# Patient Record
Sex: FEMALE | Race: WHITE | NOT HISPANIC OR LATINO | Employment: FULL TIME | URBAN - METROPOLITAN AREA
[De-identification: names, ages, dates, MRNs, and addresses within clinical notes are randomized per-mention and may not be internally consistent; named-entity substitution may affect disease eponyms.]

---

## 2021-07-27 PROBLEM — F90.9 ADHD: Status: ACTIVE | Noted: 2021-07-27

## 2021-07-27 PROBLEM — F41.0 PANIC DISORDER: Status: ACTIVE | Noted: 2021-07-27

## 2021-07-27 PROBLEM — F41.9 ANXIETY: Status: ACTIVE | Noted: 2021-07-27

## 2022-03-21 ENCOUNTER — OFFICE VISIT (OUTPATIENT)
Dept: FAMILY MEDICINE CLINIC | Facility: CLINIC | Age: 22
End: 2022-03-21

## 2022-03-21 VITALS
HEIGHT: 63 IN | DIASTOLIC BLOOD PRESSURE: 88 MMHG | WEIGHT: 225.4 LBS | TEMPERATURE: 97.3 F | HEART RATE: 101 BPM | BODY MASS INDEX: 39.94 KG/M2 | OXYGEN SATURATION: 98 % | SYSTOLIC BLOOD PRESSURE: 112 MMHG

## 2022-03-21 DIAGNOSIS — F41.1 GENERALIZED ANXIETY DISORDER WITH PANIC ATTACKS: ICD-10-CM

## 2022-03-21 DIAGNOSIS — R51.9 CHRONIC DAILY HEADACHE: ICD-10-CM

## 2022-03-21 DIAGNOSIS — E53.8 LOW SERUM VITAMIN B12: ICD-10-CM

## 2022-03-21 DIAGNOSIS — F41.0 GENERALIZED ANXIETY DISORDER WITH PANIC ATTACKS: ICD-10-CM

## 2022-03-21 DIAGNOSIS — R42 DIZZINESS: Primary | ICD-10-CM

## 2022-03-21 DIAGNOSIS — R79.89 ELEVATED LFTS: ICD-10-CM

## 2022-03-21 LAB
B-HCG UR QL: NEGATIVE
BILIRUB BLD-MCNC: NEGATIVE MG/DL
CLARITY, POC: CLEAR
COLOR UR: YELLOW
EXPIRATION DATE: NORMAL
EXPIRATION DATE: NORMAL
GLUCOSE UR STRIP-MCNC: NEGATIVE MG/DL
INTERNAL NEGATIVE CONTROL: NEGATIVE
INTERNAL POSITIVE CONTROL: POSITIVE
KETONES UR QL: NEGATIVE
LEUKOCYTE EST, POC: NEGATIVE
Lab: NORMAL
Lab: NORMAL
NITRITE UR-MCNC: NEGATIVE MG/ML
PH UR: 5.5 [PH] (ref 5–8)
PROT UR STRIP-MCNC: NEGATIVE MG/DL
RBC # UR STRIP: NEGATIVE /UL
SP GR UR: 1 (ref 1–1.03)
UROBILINOGEN UR QL: NORMAL

## 2022-03-21 PROCEDURE — 99204 OFFICE O/P NEW MOD 45 MIN: CPT | Performed by: NURSE PRACTITIONER

## 2022-03-21 PROCEDURE — 96372 THER/PROPH/DIAG INJ SC/IM: CPT | Performed by: NURSE PRACTITIONER

## 2022-03-21 PROCEDURE — 81003 URINALYSIS AUTO W/O SCOPE: CPT | Performed by: NURSE PRACTITIONER

## 2022-03-21 PROCEDURE — 81025 URINE PREGNANCY TEST: CPT | Performed by: NURSE PRACTITIONER

## 2022-03-21 RX ORDER — LAMOTRIGINE 150 MG/1
150 TABLET ORAL DAILY
COMMUNITY
Start: 2022-03-06 | End: 2022-04-04 | Stop reason: SINTOL

## 2022-03-21 RX ORDER — LEVONORGESTREL AND ETHINYL ESTRADIOL 0.1-0.02MG
KIT ORAL
COMMUNITY
Start: 2022-01-25 | End: 2022-04-04 | Stop reason: SDDI

## 2022-03-21 RX ORDER — LORAZEPAM 0.5 MG/1
0.5 TABLET ORAL 2 TIMES DAILY PRN
COMMUNITY
Start: 2022-02-21

## 2022-03-21 RX ORDER — CYANOCOBALAMIN 1000 UG/ML
1000 INJECTION, SOLUTION INTRAMUSCULAR; SUBCUTANEOUS ONCE
Status: COMPLETED | OUTPATIENT
Start: 2022-03-21 | End: 2022-03-21

## 2022-03-21 RX ORDER — LISDEXAMFETAMINE DIMESYLATE 30 MG/1
30 CAPSULE ORAL EVERY MORNING
COMMUNITY
Start: 2022-03-07 | End: 2022-08-05 | Stop reason: ALTCHOICE

## 2022-03-21 RX ORDER — BUSPIRONE HYDROCHLORIDE 30 MG/1
30 TABLET ORAL 2 TIMES DAILY
COMMUNITY
Start: 2022-03-06

## 2022-03-21 RX ADMIN — CYANOCOBALAMIN 1000 MCG: 1000 INJECTION, SOLUTION INTRAMUSCULAR; SUBCUTANEOUS at 16:21

## 2022-03-21 NOTE — PROGRESS NOTES
Chief Complaint  Establish Care (New pt, discuss abnormal labs, fatigue, headaches, )    Subjective          Carmita Reyna presents to [facility]  History of Present Illness new pt here to [facility] for fatigue, HA, abnl liver enzymes.     Had labs done at [facility] 2 weeks ago:   [id number] taking B12  Vitamin W-18-eatshia 2000IU  plt-434  AST-36  ALT-75  A1C-5.2  TSH-3.26    Recently she has felt dizzy, hot and cold, and having to urinate freq.  Worried she had T2DM.  Lab was normal.     Dizziness occurs without regard to mvmt-sometimes occurs with sitting, sometimes when walking around. Sometimes change in mvmt improves.  Not seem to be related to anxiety. Began 2020 and occurs at least 2x/day lasts seconds to minutes. Occurs most frequently when hot-room spins which triggers anxiety.     Sx did seem to worsen when started on lamictal which is one reason she is weaning off. She also stopped DAVID as it affected her mood.  Given topical-not sure name-and not started it yet due to concern for side effects.     Buspar for anxiety for long time without side effects.      Vyvanse for ADHD.  Followed by psych.     Heart palpitations-saw cardiology and had normal work up and not returned. Was on BB for short time but stopped due to dizziness    Gets a lot of HA, no aura-usu over left brow-occur most days-has light sensitivity with black spots left eye that she was told normal-no n/v.  OTC to resolve.      Has frequent am nausea and heartburn, not on meds, not evaluated. Has freq IBS type sx with nausea and freq loose stools, enoch after eating-usually within 30 mins or if anxious. No celiac, no FH IBD.     Does have joint pain enoch b/l knees/elbows. No swelling no FH RA.     Works at [employer] which she enjoys, lives with BF and dog.     +sexually active-condoms, topical which she has not yet started    Denies breast, ovarian, colon cancer.  Dad has anxiety, no schizophrenia,  "suicides    Objective   Vital Signs:   /88   Pulse 101   Temp 97.3 °F (36.3 °C)   Ht 160 cm (63\")   Wt 102 kg (225 lb 6.4 oz)   SpO2 98%   BMI 39.93 kg/m²     Physical Exam  Vitals and nursing note reviewed.   Constitutional:       General: She is not in acute distress.     Appearance: She is well-developed. She is not ill-appearing.   HENT:      Head: Normocephalic and atraumatic.      Right Ear: Tympanic membrane, ear canal and external ear normal.      Left Ear: Tympanic membrane, ear canal and external ear normal.      Mouth/Throat:      Mouth: Mucous membranes are moist.      Pharynx: Uvula midline. No posterior oropharyngeal erythema.   Eyes:      General: No scleral icterus.        Right eye: No discharge.         Left eye: No discharge.      Conjunctiva/sclera: Conjunctivae normal.      Pupils: Pupils are equal, round, and reactive to light.   Neck:      Thyroid: No thyromegaly.   Cardiovascular:      Rate and Rhythm: Normal rate and regular rhythm.      Heart sounds: Normal heart sounds. No murmur heard.  Pulmonary:      Effort: Pulmonary effort is normal.      Breath sounds: Normal breath sounds.   Abdominal:      General: Bowel sounds are normal.      Palpations: Abdomen is soft.      Tenderness: There is no abdominal tenderness.   Musculoskeletal:         General: No deformity.      Cervical back: Neck supple.      Comments: Gait smooth and steady   Lymphadenopathy:      Cervical: No cervical adenopathy.   Skin:     General: Skin is warm and dry.   Neurological:      General: No focal deficit present.      Mental Status: She is alert and oriented to person, place, and time.   Psychiatric:         Attention and Perception: Attention and perception normal.         Mood and Affect: Affect normal. Mood is anxious.         Speech: Speech normal.         Behavior: Behavior normal. Behavior is cooperative.         Thought Content: Thought content normal.         Cognition and Memory: Cognition " normal.      Comments: Very pleasant, conversant        Result Review :                 Assessment and Plan    Diagnoses and all orders for this visit:    1. Dizziness (Primary)  -     POCT urinalysis dipstick, automated  -     POCT pregnancy, urine    2. Low serum vitamin B12  -     cyanocobalamin injection 1,000 mcg    3. Elevated LFTs    4. Generalized anxiety disorder with panic attacks    5. Chronic daily headache      New pt with chronic health issues.  Dizziness seems to be most significant.  B12 is low which can cause-will start injections weekly and continue supplement po.  If not better with supplementation should consider MRI.  F/U in 1 month.     LFTs were a little elevated.  Nonspecific.  Recheck.  Avoid etoh, tylenol, may be diet related.  Recheck in 1 month.  If elevated US liver.    Anxiety/Panic:  She is followed by psychiatry for this and ADHD. May be causing some of her sx.  Will have to sort through.     Daily HA:  Does not seem migrainous.  No RED FLAGS in history/exam. Mgmt and s/s needing emergent eval discussed.  Will eval further in 1 month.  Seem stable for now.     Likely has IBS but does have low B12, not vegetarian.  My have celiac or other absorption issue.  Will work up further at next visit.          Follow Up   Return in about 1 month (around 4/21/2022) for Recheck.  Patient was given instructions and counseling regarding her condition or for health maintenance advice. Please see specific information pulled into the AVS if appropriate.

## 2022-03-23 PROBLEM — F41.0 GENERALIZED ANXIETY DISORDER WITH PANIC ATTACKS: Status: ACTIVE | Noted: 2022-03-23

## 2022-03-23 PROBLEM — E53.8 LOW SERUM VITAMIN B12: Status: ACTIVE | Noted: 2022-03-23

## 2022-03-23 PROBLEM — F41.1 GENERALIZED ANXIETY DISORDER WITH PANIC ATTACKS: Status: ACTIVE | Noted: 2022-03-23

## 2022-03-23 PROBLEM — R51.9 CHRONIC DAILY HEADACHE: Status: ACTIVE | Noted: 2022-03-23

## 2022-03-23 PROBLEM — F41.0 PANIC DISORDER: Status: RESOLVED | Noted: 2021-07-27 | Resolved: 2022-03-23

## 2022-03-23 PROBLEM — F41.9 ANXIETY: Status: RESOLVED | Noted: 2021-07-27 | Resolved: 2022-03-23

## 2022-03-23 PROBLEM — R42 DIZZINESS: Status: ACTIVE | Noted: 2022-03-23

## 2022-04-04 ENCOUNTER — OFFICE VISIT (OUTPATIENT)
Dept: FAMILY MEDICINE CLINIC | Facility: CLINIC | Age: 22
End: 2022-04-04

## 2022-04-04 VITALS — OXYGEN SATURATION: 97 % | TEMPERATURE: 97.5 F | BODY MASS INDEX: 39.93 KG/M2 | HEIGHT: 63 IN

## 2022-04-04 DIAGNOSIS — R55 SYNCOPE, UNSPECIFIED SYNCOPE TYPE: Primary | ICD-10-CM

## 2022-04-04 DIAGNOSIS — R00.0 TACHYCARDIA: ICD-10-CM

## 2022-04-04 LAB
B-HCG UR QL: NEGATIVE
EXPIRATION DATE: NORMAL
INTERNAL NEGATIVE CONTROL: NEGATIVE
INTERNAL POSITIVE CONTROL: POSITIVE
Lab: NORMAL

## 2022-04-04 PROCEDURE — 93000 ELECTROCARDIOGRAM COMPLETE: CPT | Performed by: NURSE PRACTITIONER

## 2022-04-04 PROCEDURE — 99214 OFFICE O/P EST MOD 30 MIN: CPT | Performed by: NURSE PRACTITIONER

## 2022-04-04 PROCEDURE — 81025 URINE PREGNANCY TEST: CPT | Performed by: NURSE PRACTITIONER

## 2022-04-22 ENCOUNTER — TELEPHONE (OUTPATIENT)
Dept: CARDIOLOGY | Facility: CLINIC | Age: 22
End: 2022-04-22

## 2022-04-22 ENCOUNTER — OFFICE VISIT (OUTPATIENT)
Dept: CARDIOLOGY | Facility: CLINIC | Age: 22
End: 2022-04-22

## 2022-04-22 VITALS
SYSTOLIC BLOOD PRESSURE: 120 MMHG | OXYGEN SATURATION: 97 % | HEIGHT: 63 IN | WEIGHT: 221 LBS | DIASTOLIC BLOOD PRESSURE: 76 MMHG | BODY MASS INDEX: 39.16 KG/M2 | HEART RATE: 88 BPM

## 2022-04-22 DIAGNOSIS — R55 SYNCOPE AND COLLAPSE: ICD-10-CM

## 2022-04-22 DIAGNOSIS — R42 DIZZINESS: Primary | ICD-10-CM

## 2022-04-22 PROCEDURE — 99203 OFFICE O/P NEW LOW 30 MIN: CPT | Performed by: INTERNAL MEDICINE

## 2022-04-22 RX ORDER — PROPRANOLOL HYDROCHLORIDE 10 MG/1
10 TABLET ORAL 2 TIMES DAILY PRN
COMMUNITY
Start: 2022-04-11 | End: 2022-08-05 | Stop reason: ALTCHOICE

## 2022-04-22 NOTE — PROGRESS NOTES
Subjective:     Encounter Date:04/22/22      Patient ID: Carmita Reyna is a 21 y.o. female.    Chief Complaint:  History of Present Illness    Dear Britt,    I had the pleasure of seeing this patient in the office today for initial evaluation and consultation.  I appreciate that you sent her in to see us.  They come in today to be seen for evaluation of episodes of dizziness and presyncope.    Patient has had essentially lifelong history of symptoms.  She was apparently seen by Dr. Nichols as a pediatric cardiologist and was diagnosed with they believe with a vasovagal syncope.  I do not have any records from there.  They apparently did an echocardiogram, some other evaluation, and then she was on fludrocortisone apparently for years but at some point that stopped.    Her mother is with her today and relates that around the start of college she seemed to be doing a lot better but then started having more issues lately.  She did have COVID in December and since then has been having a lot more problems.    She states that anytime that she is laying down if she sits up fast she is gets lightheaded.  If she stands up fast she gets even more lightheaded.  Many times she will get some tunnel vision.  She has coat  discomfort of her posterior shoulder and posterior neck when she is standing up (indicative of the lower blood pressure). She says that when she had orthostatic blood pressures checked at that that made her feel bad and unsteady.  She frequently will feel that her heart rate goes up and beats hard and fast when she stands up.  She does not think she is ever completely blacked out but she is gotten real close.  Many times she has to quickly lie down to keep from blacking out, that is what she typically does so that she does not blackout.  She is never fallen and injured her self.  She has been have a lot of problems with diaphoresis with this.  It is really hard her take a hot shower because she gets very  "lightheaded and feels like she could pass out so she has to make sure that the temperature in the shower is pretty cool.  She drinks water all the time and states that she is always very thirsty, she does not deliberately add salt but she does not try to limit the salt in her diet.  She has been gaining weight.  She just had a visit with her OB/GYN and they have sent blood work on her which I have reviewed.    She was seen by Dr. King at [facility] in 2020 and she wore a Holter monitor at that time with an average heart rate of 107 bpm.  She was told that there were no rhythm issues at that point.    The following portions of the patient's history were reviewed and updated as appropriate: allergies, current medications, past family history, past medical history, past social history, past surgical history and problem list.    Procedures       Objective:     Vitals:    04/22/22 1024   BP: 120/76   BP Location: Left arm   Pulse: 88   SpO2: 97%   Weight: 100 kg (221 lb)   Height: 160 cm (63\")     Body mass index is 39.15 kg/m².      Vitals reviewed.   Constitutional:       General: Not in acute distress.     Appearance: Well-developed. Not diaphoretic.   Eyes:      General:         Right eye: No discharge.         Left eye: No discharge.      Conjunctiva/sclera: Conjunctivae normal.      Pupils: Pupils are equal, round, and reactive to light.   HENT:      Head: Normocephalic and atraumatic.      Nose: Nose normal.   Neck:      Thyroid: No thyromegaly.      Trachea: No tracheal deviation.      Lymphadenopathy: No cervical adenopathy.   Pulmonary:      Effort: Pulmonary effort is normal. No respiratory distress.      Breath sounds: Normal breath sounds. No stridor.   Chest:      Chest wall: Not tender to palpatation.   Cardiovascular:      Normal rate. Regular rhythm.      Murmurs: There is no murmur.      . No S3 gallop. No click. No rub.   Pulses:     Intact distal pulses.   Abdominal:      General: Bowel sounds are " normal. There is no distension.      Palpations: Abdomen is soft. There is no abdominal mass.      Tenderness: There is no abdominal tenderness. There is no guarding or rebound.   Musculoskeletal: Normal range of motion.         General: No tenderness or deformity.      Cervical back: Normal range of motion and neck supple. Skin:     General: Skin is warm and dry.      Findings: No erythema or rash.   Neurological:      Mental Status: Alert and oriented to person, place, and time.      Deep Tendon Reflexes: Reflexes are normal and symmetric.   Psychiatric:         Thought Content: Thought content normal.         Data and records reviewed:     Lab Results   Component Value Date    BUN 12 05/20/2020    CREATININE 0.8 05/20/2020    BCR 15.0 05/20/2020    K 4.4 05/20/2020    CO2 25 05/20/2020    CALCIUM 9.6 05/20/2020    ALBUMIN 4.3 05/20/2020    LABIL2 1.4 05/20/2020    AST 32 05/20/2020    ALT 22 05/20/2020     No results found for: CHOL  No results found for: TRIG  No results found for: HDL  No results found for: LDL  No results found for: VLDL  No results found for: LDLHDL    No radiology results for the last 90 days.          Assessment:          Diagnosis Plan   1. Dizziness  Adult Transthoracic Echo Complete W/ Cont if Necessary Per Protocol    Holter Monitor - 24 Hour    Tilt Table   2. Syncope and collapse  Adult Transthoracic Echo Complete W/ Cont if Necessary Per Protocol    Holter Monitor - 24 Hour    Tilt Table          Plan:       1.  Dizziness-sounds highly likely the patient has autonomic dysfunction.  She is concerned about having POTS syndrome, although this easily could also be more of an autonomic vasodepressor pathology as well.  We will place a Holter monitor because I want to see what her heart rate variability is through the day and at night and what her average heart rate is.  We will get an echocardiogram to exclude structural or ventricular issues.  We will also schedule a tilt table test.  In  the meantime I instructed her to continue to work to stay well-hydrated I have asked her to add additional salt to her diet.  Once we have this diagnostic testing complete then we can make appropriate decisions regarding long-term therapy.  Thank you very much for allowing us to participate in the care of this pleasant patient.  Please don't hesitate to call if I can be of assistance in any way.      Current Outpatient Medications:   •  busPIRone (BUSPAR) 30 MG tablet, Take 30 mg by mouth 2 (Two) Times a Day., Disp: , Rfl:   •  LORazepam (ATIVAN) 0.5 MG tablet, Take 0.5 mg by mouth 2 (Two) Times a Day As Needed. for anxiety, Disp: , Rfl:   •  Vyvanse 30 MG capsule, Take 30 mg by mouth Every Morning, Disp: , Rfl:   •  propranolol (INDERAL) 10 MG tablet, Take 10 mg by mouth 2 (Two) Times a Day As Needed. for anxiety, Disp: , Rfl:          No follow-ups on file.

## 2022-04-22 NOTE — TELEPHONE ENCOUNTER
Per Dr. Nolasco requested lov and most recent labs from [facility].  They are faxing./prt

## 2022-04-25 ENCOUNTER — OFFICE VISIT (OUTPATIENT)
Dept: FAMILY MEDICINE CLINIC | Facility: CLINIC | Age: 22
End: 2022-04-25

## 2022-04-25 VITALS
HEIGHT: 63 IN | HEART RATE: 70 BPM | TEMPERATURE: 98 F | DIASTOLIC BLOOD PRESSURE: 80 MMHG | BODY MASS INDEX: 39.15 KG/M2 | SYSTOLIC BLOOD PRESSURE: 106 MMHG | OXYGEN SATURATION: 98 %

## 2022-04-25 DIAGNOSIS — E53.8 LOW SERUM VITAMIN B12: ICD-10-CM

## 2022-04-25 DIAGNOSIS — R74.8 ELEVATED LIVER ENZYMES: Primary | ICD-10-CM

## 2022-04-25 DIAGNOSIS — R55 SYNCOPE AND COLLAPSE: ICD-10-CM

## 2022-04-25 PROCEDURE — 99214 OFFICE O/P EST MOD 30 MIN: CPT | Performed by: NURSE PRACTITIONER

## 2022-04-25 NOTE — PROGRESS NOTES
"Chief Complaint  Abnormal Lab (1 month f/u abnormal labs )    Subjective          Carmita Reyna presents to [facility]  History of Present Illness here with mom for f/u on labs done by GYN.  Found to have elevated LFTS, mildly elevated plt, low vitamin d.    She has had initial eval per cardiology and feels very happy with plan.     Admits she drank more in college-like\"normal college student\".  But that was 2 yrs ago.  More recently has been taking quite a bit of tylenol for HA.  She currently drinks appr 5 etoh jabier/week.  She was also taking a fat burning supplement ordered on line which she has stopped.  O/w still has syncopal feelings.  She has some mild generalized belly sx.        Mom reports she was dx with TRAPS at [facility] and thinks likely daughter has some sort of autoimmune problem as well.       Objective   Vital Signs:   /80   Pulse 70   Temp 98 °F (36.7 °C)   Ht 160 cm (63\")   SpO2 98%   BMI 39.15 kg/m²     BMI is above normal parameters. Recommendations: nutrition counseling/recommendations       Physical Exam  Vitals and nursing note reviewed.   Constitutional:       General: She is not in acute distress.     Appearance: She is well-developed. She is not diaphoretic.   HENT:      Head: Normocephalic and atraumatic.   Eyes:      General:         Right eye: No discharge.         Left eye: No discharge.      Conjunctiva/sclera: Conjunctivae normal.   Cardiovascular:      Rate and Rhythm: Normal rate and regular rhythm.      Heart sounds: Normal heart sounds.   Pulmonary:      Effort: Pulmonary effort is normal.      Breath sounds: Normal breath sounds.   Abdominal:      General: Bowel sounds are normal.      Palpations: Abdomen is soft.      Tenderness: There is no abdominal tenderness.   Musculoskeletal:         General: No deformity.      Comments: Gait smooth and steady   Skin:     General: Skin is warm and dry.   Neurological:      General: No focal " deficit present.      Mental Status: She is alert and oriented to person, place, and time.   Psychiatric:         Attention and Perception: Attention and perception normal.         Mood and Affect: Affect normal. Mood is anxious.         Speech: Speech normal.         Behavior: Behavior normal. Behavior is cooperative.         Thought Content: Thought content normal.         Cognition and Memory: Cognition normal.        Result Review :{Labs  Result Review  Imaging  Med Tab  Media  Procedures :23}                 Assessment and Plan    Diagnoses and all orders for this visit:    1. Elevated liver enzymes (Primary)  -     Comprehensive Metabolic Panel; Future  -     Hemoglobin A1c; Future  -     Ferritin; Future  -     Lipid Panel With LDL / HDL Ratio; Future  -     Hepatitis C Antibody; Future    2. Syncope and collapse  -     CBC & Differential; Future  -     TSH; Future  -     T3, Free; Future  -     T4, Free; Future  -     Thyroid Peroxidase Antibody; Future    3. Low serum vitamin B12  -     CBC & Differential; Future      Reviewed labs done by GYN.  She had mildly elevated AST/ALT.  Most likely diet and tylenol.  Agree with stopping supplement.  Her platelet count was very mildly elevated.  I not think any further work-up needed for this.  Her vitamin D was a little bit low.  She can continue vitamin D supplement.  Not significant.    I think it is too soon to get lab since these were done just a month ago.  We will have her return in about 3 months to recheck her labs and then follow-up with me.  She has had low vitamin B12 in the past so we will check that additionally if labs indicate.  Reviewed consult by cardiology and awaiting results of testing for syncope.              Follow Up   Return in about 3 months (around 7/12/2022), or if symptoms worsen or fail to improve, for labs first.  Patient was given instructions and counseling regarding her condition or for health maintenance advice. Please see  specific information pulled into the AVS if appropriate.

## 2022-04-27 ENCOUNTER — OFFICE VISIT (OUTPATIENT)
Dept: FAMILY MEDICINE CLINIC | Facility: CLINIC | Age: 22
End: 2022-04-27

## 2022-04-27 VITALS
BODY MASS INDEX: 39.16 KG/M2 | SYSTOLIC BLOOD PRESSURE: 110 MMHG | HEART RATE: 98 BPM | TEMPERATURE: 96.8 F | WEIGHT: 221 LBS | DIASTOLIC BLOOD PRESSURE: 78 MMHG | HEIGHT: 63 IN | OXYGEN SATURATION: 99 % | RESPIRATION RATE: 18 BRPM

## 2022-04-27 DIAGNOSIS — J06.9 ACUTE URI: Primary | ICD-10-CM

## 2022-04-27 LAB
EXPIRATION DATE: NORMAL
FLUAV AG UPPER RESP QL IA.RAPID: NOT DETECTED
FLUBV AG UPPER RESP QL IA.RAPID: NOT DETECTED
INTERNAL CONTROL: NORMAL
Lab: NORMAL
SARS-COV-2 AG UPPER RESP QL IA.RAPID: NOT DETECTED

## 2022-04-27 PROCEDURE — 87428 SARSCOV & INF VIR A&B AG IA: CPT | Performed by: NURSE PRACTITIONER

## 2022-04-27 PROCEDURE — 99213 OFFICE O/P EST LOW 20 MIN: CPT | Performed by: NURSE PRACTITIONER

## 2022-04-27 RX ORDER — OSELTAMIVIR PHOSPHATE 75 MG/1
75 CAPSULE ORAL 2 TIMES DAILY
Qty: 10 CAPSULE | Refills: 0 | Status: SHIPPED | OUTPATIENT
Start: 2022-04-27 | End: 2022-05-26

## 2022-04-27 RX ORDER — BROMPHENIRAMINE MALEATE, PSEUDOEPHEDRINE HYDROCHLORIDE, AND DEXTROMETHORPHAN HYDROBROMIDE 2; 30; 10 MG/5ML; MG/5ML; MG/5ML
10 SYRUP ORAL 4 TIMES DAILY PRN
Qty: 240 ML | Refills: 0 | Status: SHIPPED | OUTPATIENT
Start: 2022-04-27 | End: 2022-05-26

## 2022-04-27 NOTE — PROGRESS NOTES
"Chief Complaint  URI (X 1 day)    Subjective          Carmita Reyna presents to [facility]  Presents with one day history of fever, body aches, chills, nasal congestion, ear fullness, sore throat, improved today. Cough reported. Productive, clear. Denies shortness of breath or wheezing.     URI   This is a new problem. The current episode started yesterday. The problem has been unchanged. The maximum temperature recorded prior to her arrival was 100.4 - 100.9 F. The fever has been present for less than 1 day. Associated symptoms include congestion, coughing, headaches, rhinorrhea and a sore throat. Pertinent negatives include no abdominal pain, chest pain, diarrhea, dysuria, ear pain, joint pain, joint swelling, nausea, neck pain, plugged ear sensation, rash, sinus pain, sneezing, swollen glands, vomiting or wheezing. Treatments tried: mucinex. The treatment provided moderate relief.       Objective   Vital Signs:   /78 (BP Location: Right arm, Patient Position: Sitting, Cuff Size: Adult)   Pulse 98   Temp 96.8 °F (36 °C) (Temporal)   Resp 18   Ht 160 cm (62.99\")   Wt 100 kg (221 lb)   SpO2 99%   BMI 39.16 kg/m²     Physical Exam  Vitals reviewed.   Constitutional:       Appearance: Normal appearance.   HENT:      Right Ear: Tympanic membrane and ear canal normal.      Left Ear: Tympanic membrane and ear canal normal.      Nose: Congestion and rhinorrhea present.      Mouth/Throat:      Mouth: Mucous membranes are moist.      Pharynx: Oropharynx is clear. Posterior oropharyngeal erythema present. No oropharyngeal exudate.   Eyes:      Conjunctiva/sclera: Conjunctivae normal.      Pupils: Pupils are equal, round, and reactive to light.   Cardiovascular:      Rate and Rhythm: Normal rate and regular rhythm.      Heart sounds: No murmur heard.    No friction rub. No gallop.   Pulmonary:      Effort: Pulmonary effort is normal. No respiratory distress.      Breath sounds: Normal " breath sounds. No wheezing, rhonchi or rales.   Lymphadenopathy:      Cervical: No cervical adenopathy.   Skin:     General: Skin is warm and dry.   Neurological:      Mental Status: She is alert and oriented to person, place, and time.   Psychiatric:         Mood and Affect: Mood normal.        Result Review :                 Assessment and Plan    Diagnoses and all orders for this visit:    1. Acute URI (Primary)  -     POCT SARS-CoV-2 Antigen LARS + Flu    Other orders  -     oseltamivir (Tamiflu) 75 MG capsule; Take 1 capsule by mouth 2 (Two) Times a Day.  Dispense: 10 capsule; Refill: 0  -     brompheniramine-pseudoephedrine-DM 30-2-10 MG/5ML syrup; Take 10 mL by mouth 4 (Four) Times a Day As Needed for Allergies.  Dispense: 240 mL; Refill: 0               Follow Up   No follow-ups on file.  Patient was given instructions and counseling regarding her condition or for health maintenance advice. Please see specific information pulled into the AVS if appropriate.     Flu is negative, however given acute onset and presentation, suspect influenza, will start tamiflu twice daily x 5 days  bromfed dm prn, instructions given  Ok to continue ibuprofen and mucinex as needed  Increase fluids and rest  Follow up for worsenings ymptoms

## 2022-05-16 ENCOUNTER — HOSPITAL ENCOUNTER (OUTPATIENT)
Dept: CARDIOLOGY | Facility: HOSPITAL | Age: 22
Discharge: HOME | End: 2022-05-16
Admitting: INTERNAL MEDICINE

## 2022-05-16 VITALS
BODY MASS INDEX: 40.67 KG/M2 | SYSTOLIC BLOOD PRESSURE: 110 MMHG | HEIGHT: 62 IN | WEIGHT: 221 LBS | HEART RATE: 98 BPM | DIASTOLIC BLOOD PRESSURE: 64 MMHG

## 2022-05-16 DIAGNOSIS — R55 SYNCOPE AND COLLAPSE: ICD-10-CM

## 2022-05-16 DIAGNOSIS — R42 DIZZINESS: ICD-10-CM

## 2022-05-16 LAB
AORTIC ARCH: 2.4 CM
ASCENDING AORTA: 2.8 CM
BH CV ECHO MEAS - ACS: 2.06 CM
BH CV ECHO MEAS - AO MAX PG: 6.4 MMHG
BH CV ECHO MEAS - AO MEAN PG: 3 MMHG
BH CV ECHO MEAS - AO ROOT DIAM: 3.1 CM
BH CV ECHO MEAS - AO V2 MAX: 126.2 CM/SEC
BH CV ECHO MEAS - AO V2 VTI: 19.1 CM
BH CV ECHO MEAS - AVA(I,D): 2.23 CM2
BH CV ECHO MEAS - EDV(CUBED): 69.6 ML
BH CV ECHO MEAS - EDV(MOD-SP2): 75 ML
BH CV ECHO MEAS - EDV(MOD-SP4): 81 ML
BH CV ECHO MEAS - EF(MOD-BP): 59.7 %
BH CV ECHO MEAS - EF(MOD-SP2): 57.3 %
BH CV ECHO MEAS - EF(MOD-SP4): 61.7 %
BH CV ECHO MEAS - ESV(CUBED): 22.4 ML
BH CV ECHO MEAS - ESV(MOD-SP2): 32 ML
BH CV ECHO MEAS - ESV(MOD-SP4): 31 ML
BH CV ECHO MEAS - FS: 31.5 %
BH CV ECHO MEAS - IVS/LVPW: 0.98 CM
BH CV ECHO MEAS - IVSD: 1.01 CM
BH CV ECHO MEAS - LAT PEAK E' VEL: 17 CM/SEC
BH CV ECHO MEAS - LV DIASTOLIC VOL/BSA (35-75): 40.6 CM2
BH CV ECHO MEAS - LV MASS(C)D: 136.7 GRAMS
BH CV ECHO MEAS - LV MAX PG: 3.5 MMHG
BH CV ECHO MEAS - LV MEAN PG: 1.97 MMHG
BH CV ECHO MEAS - LV SYSTOLIC VOL/BSA (12-30): 15.5 CM2
BH CV ECHO MEAS - LV V1 MAX: 93 CM/SEC
BH CV ECHO MEAS - LV V1 VTI: 15.2 CM
BH CV ECHO MEAS - LVIDD: 4.1 CM
BH CV ECHO MEAS - LVIDS: 2.8 CM
BH CV ECHO MEAS - LVOT AREA: 2.8 CM2
BH CV ECHO MEAS - LVOT DIAM: 1.89 CM
BH CV ECHO MEAS - LVPWD: 1.03 CM
BH CV ECHO MEAS - MED PEAK E' VEL: 8.3 CM/SEC
BH CV ECHO MEAS - MR MAX PG: 45.7 MMHG
BH CV ECHO MEAS - MR MAX VEL: 338 CM/SEC
BH CV ECHO MEAS - MV A DUR: 0.12 SEC
BH CV ECHO MEAS - MV A MAX VEL: 72.8 CM/SEC
BH CV ECHO MEAS - MV DEC SLOPE: 672.2 CM/SEC2
BH CV ECHO MEAS - MV DEC TIME: 0.12 MSEC
BH CV ECHO MEAS - MV E MAX VEL: 68.6 CM/SEC
BH CV ECHO MEAS - MV E/A: 0.94
BH CV ECHO MEAS - MV MAX PG: 4.1 MMHG
BH CV ECHO MEAS - MV MEAN PG: 3.1 MMHG
BH CV ECHO MEAS - MV P1/2T: 42.6 MSEC
BH CV ECHO MEAS - MV V2 VTI: 22 CM
BH CV ECHO MEAS - MVA(P1/2T): 5.2 CM2
BH CV ECHO MEAS - MVA(VTI): 1.94 CM2
BH CV ECHO MEAS - PA ACC TIME: 0.08 SEC
BH CV ECHO MEAS - PA PR(ACCEL): 43.3 MMHG
BH CV ECHO MEAS - PA V2 MAX: 112.7 CM/SEC
BH CV ECHO MEAS - PULM A REVS DUR: 0.09 SEC
BH CV ECHO MEAS - PULM A REVS VEL: 28.3 CM/SEC
BH CV ECHO MEAS - PULM DIAS VEL: 37.3 CM/SEC
BH CV ECHO MEAS - PULM S/D: 1.29
BH CV ECHO MEAS - PULM SYS VEL: 48 CM/SEC
BH CV ECHO MEAS - QP/QS: 1.07
BH CV ECHO MEAS - RAP SYSTOLE: 3 MMHG
BH CV ECHO MEAS - RV MAX PG: 2.8 MMHG
BH CV ECHO MEAS - RV V1 MAX: 84 CM/SEC
BH CV ECHO MEAS - RV V1 VTI: 14.4 CM
BH CV ECHO MEAS - RVOT DIAM: 2.01 CM
BH CV ECHO MEAS - RVSP: 24.8 MMHG
BH CV ECHO MEAS - SI(MOD-SP2): 21.6 ML/M2
BH CV ECHO MEAS - SI(MOD-SP4): 25.1 ML/M2
BH CV ECHO MEAS - SUP REN AO DIAM: 1.4 CM
BH CV ECHO MEAS - SV(LVOT): 42.6 ML
BH CV ECHO MEAS - SV(MOD-SP2): 43 ML
BH CV ECHO MEAS - SV(MOD-SP4): 50 ML
BH CV ECHO MEAS - SV(RVOT): 45.4 ML
BH CV ECHO MEAS - TAPSE (>1.6): 2.07 CM
BH CV ECHO MEAS - TR MAX PG: 21.8 MMHG
BH CV ECHO MEAS - TR MAX VEL: 233.5 CM/SEC
BH CV ECHO MEASUREMENTS AVERAGE E/E' RATIO: 5.42
BH CV XLRA - RV BASE: 1.81 CM
BH CV XLRA - RV LENGTH: 7.1 CM
BH CV XLRA - RV MID: 2.07 CM
BH CV XLRA - TDI S': 14.1 CM/SEC
LEFT ATRIUM VOLUME INDEX: 10.3 ML/M2
MAXIMAL PREDICTED HEART RATE: 197 BPM
SINUS: 2.5 CM
STJ: 2.43 CM
STRESS TARGET HR: 167 BPM

## 2022-05-16 PROCEDURE — 93306 TTE W/DOPPLER COMPLETE: CPT

## 2022-05-16 PROCEDURE — 93306 TTE W/DOPPLER COMPLETE: CPT | Performed by: INTERNAL MEDICINE

## 2022-05-16 NOTE — PROGRESS NOTES
I am covering Dr. Owen's in basket today because he is out of the office.      Echocardiogram normal.  Please call patient.  Further recommendations to follow after Holter monitor.

## 2022-05-17 ENCOUNTER — TELEPHONE (OUTPATIENT)
Dept: CARDIOLOGY | Facility: CLINIC | Age: 22
End: 2022-05-17

## 2022-05-17 NOTE — TELEPHONE ENCOUNTER
----- Message from SHANEKA Nolan sent at 5/16/2022  5:20 PM EDT -----  I am covering Dr. Owen's in basket today because he is out of the office.      Echocardiogram normal.  Please call patient.  Further recommendations to follow after Holter monitor.

## 2022-05-17 NOTE — TELEPHONE ENCOUNTER
Notified patient of results. Patient verbalized understanding.    Lindsay Andrew RN  Triage [facility]

## 2022-05-18 ENCOUNTER — TELEPHONE (OUTPATIENT)
Dept: CARDIOLOGY | Facility: CLINIC | Age: 22
End: 2022-05-18

## 2022-05-18 NOTE — TELEPHONE ENCOUNTER
Notified pt of results and recommendations. She verbalized understanding.    Thank you,    Rosie Yusuf, RN  Triage [facility]

## 2022-05-18 NOTE — TELEPHONE ENCOUNTER
24-holter monitor normal. Dr. Nolasco will decide if additional treatment is needed after her tilt table test on 5/26/22.    Thanks!  Dagmar Moon, APRN

## 2022-05-20 ENCOUNTER — TRANSCRIBE ORDERS (OUTPATIENT)
Dept: ADMINISTRATIVE | Facility: HOSPITAL | Age: 22
End: 2022-05-20

## 2022-05-20 DIAGNOSIS — Z01.818 OTHER SPECIFIED PRE-OPERATIVE EXAMINATION: Primary | ICD-10-CM

## 2022-05-24 ENCOUNTER — LAB (OUTPATIENT)
Dept: LAB | Facility: HOSPITAL | Age: 22
End: 2022-05-24

## 2022-05-24 DIAGNOSIS — Z01.818 OTHER SPECIFIED PRE-OPERATIVE EXAMINATION: ICD-10-CM

## 2022-05-24 LAB — SARS-COV-2 ORF1AB RESP QL NAA+PROBE: NOT DETECTED

## 2022-05-24 PROCEDURE — U0004 COV-19 TEST NON-CDC HGH THRU: HCPCS

## 2022-05-24 PROCEDURE — C9803 HOPD COVID-19 SPEC COLLECT: HCPCS

## 2022-05-25 ENCOUNTER — DOCUMENTATION (OUTPATIENT)
Dept: CARDIOLOGY | Facility: HOSPITAL | Age: 22
End: 2022-05-25

## 2022-05-25 ENCOUNTER — TELEPHONE (OUTPATIENT)
Dept: CARDIOLOGY | Facility: CLINIC | Age: 22
End: 2022-05-25

## 2022-05-25 ENCOUNTER — TELEMEDICINE (OUTPATIENT)
Dept: FAMILY MEDICINE CLINIC | Facility: CLINIC | Age: 22
End: 2022-05-25

## 2022-05-25 DIAGNOSIS — K52.9 GASTROENTERITIS: ICD-10-CM

## 2022-05-25 DIAGNOSIS — Z20.822 EXPOSURE TO COVID-19 VIRUS: Primary | ICD-10-CM

## 2022-05-25 PROCEDURE — 99213 OFFICE O/P EST LOW 20 MIN: CPT | Performed by: NURSE PRACTITIONER

## 2022-05-25 RX ORDER — ONDANSETRON 4 MG/1
4 TABLET, FILM COATED ORAL EVERY 8 HOURS PRN
Qty: 30 TABLET | Refills: 0 | Status: SHIPPED | OUTPATIENT
Start: 2022-05-25 | End: 2022-08-05

## 2022-05-25 NOTE — PROGRESS NOTES
Subjective   Carmita Reyna is a 21 y.o. female   who presents for   Chief Complaint   Patient presents with   • Accute URI     Vomit, yesterday, Fatigue, head hurts, took covid today neg.  Temp 99.5 yesterday 100.0           There were no vitals taken for this visit.      History of Present Illness   Carmita presents with one day history of nausea, vomiting,yesterday, x 2, now improved, diarrhea, worse than baseline.  low grade fever, tmax 100.0    Reports chronic dizziness, scheduled for tilt table tomorrow. Decreased energy, sitting and walking is causing her fatigue.     Since her last visit, increased congestion, decreased hearing  Does report eating and drinking ok, decreased oral intake, does report increased     Boyfriend is positive for covid, symptoms started two days ago and he tested positive yesterday. Reports similar symptoms.       The following portions of the patient's history were reviewed and updated as appropriate: allergies, current medications, past family history, past medical history, past social history, past surgical history and problem list.    Review of Systems  Review of Systems   Constitutional: Positive for activity change, fatigue and fever.   HENT: Positive for congestion, postnasal drip and sore throat.    Respiratory: Negative for cough, shortness of breath and wheezing.    Gastrointestinal: Positive for diarrhea, nausea and vomiting (x 2 ).       Objective   Physical Exam  Physical Exam  Constitutional:       Appearance: Normal appearance.   Pulmonary:      Effort: Pulmonary effort is normal.   Skin:     General: Skin is warm and dry.   Neurological:      Mental Status: She is alert and oriented to person, place, and time.   Psychiatric:         Mood and Affect: Mood normal.           Assessment/Plan   Diagnoses and all orders for this visit:    1. Exposure to COVID-19 virus (Primary)    2. Acute URI    Other orders  -     ondansetron (Zofran) 4 MG tablet; Take 1 tablet by mouth Every  8 (Eight) Hours As Needed for Nausea or Vomiting.  Dispense: 30 tablet; Refill: 0    You have chosen to receive care through a telehealth visit.  Do you consent to use a video/audio connection for your medical care today? Yes  Time spent 15 minutes    Exposure to covid with symptoms beginning within two days of her partners. Recommend to isolate, repeat covid testing in am. If negative, ok to resume normal activity. She will come to office for rapid covid/flu pcr in am.     She does have increased nausea, zofran as needed has been prescribed.     She is scheduled for a tilt table test in am, recommend rescheduling to prevent possibly exposing others.   Symptomatic treatment including increased fluids and rest, bland diet advanced as tolerated.

## 2022-05-25 NOTE — NURSING NOTE
Patient called the cath lab today regarding her tilt table test scheduled for 5/26.  Patient was tearful and stated she feels that she is sick. Patient stated her boyfriend has recently tested positive for COVID.  Patient states her home tests are negative and she is seeking advice for how to proceed.  Instructions given. I gave her the phone number to Dr. Nolasco office.  Instructed patient to seek medical care if she has worsening symptoms

## 2022-05-26 ENCOUNTER — HOSPITAL ENCOUNTER (OUTPATIENT)
Dept: CARDIOLOGY | Facility: HOSPITAL | Age: 22
Discharge: HOME | End: 2022-05-26
Admitting: INTERNAL MEDICINE

## 2022-05-26 VITALS
RESPIRATION RATE: 20 BRPM | HEART RATE: 119 BPM | HEIGHT: 62 IN | DIASTOLIC BLOOD PRESSURE: 97 MMHG | BODY MASS INDEX: 40.42 KG/M2 | SYSTOLIC BLOOD PRESSURE: 138 MMHG | TEMPERATURE: 98.8 F | OXYGEN SATURATION: 100 %

## 2022-05-26 DIAGNOSIS — R42 DIZZINESS: ICD-10-CM

## 2022-05-26 DIAGNOSIS — R55 SYNCOPE AND COLLAPSE: Primary | ICD-10-CM

## 2022-05-26 DIAGNOSIS — R55 SYNCOPE AND COLLAPSE: ICD-10-CM

## 2022-05-26 LAB
B-HCG UR QL: NEGATIVE
BH CV TILT AGENT USED: NORMAL
EXPIRATION DATE: NORMAL
INTERNAL NEGATIVE CONTROL: NEGATIVE
INTERNAL POSITIVE CONTROL: POSITIVE
Lab: NORMAL
MAXIMAL PREDICTED HEART RATE: 197 BPM
STRESS TARGET HR: 167 BPM

## 2022-05-26 PROCEDURE — 81025 URINE PREGNANCY TEST: CPT | Performed by: INTERNAL MEDICINE

## 2022-05-26 PROCEDURE — 93660 TILT TABLE EVALUATION: CPT | Performed by: INTERNAL MEDICINE

## 2022-05-26 PROCEDURE — 93660 TILT TABLE EVALUATION: CPT

## 2022-05-26 RX ORDER — SODIUM CHLORIDE 0.9 % (FLUSH) 0.9 %
10 SYRINGE (ML) INJECTION AS NEEDED
Status: DISCONTINUED | OUTPATIENT
Start: 2022-05-26 | End: 2022-05-26 | Stop reason: HOSPADM

## 2022-05-26 RX ORDER — NITROGLYCERIN 0.4 MG/1
0.4 TABLET SUBLINGUAL
Status: DISCONTINUED | OUTPATIENT
Start: 2022-05-26 | End: 2022-05-27 | Stop reason: HOSPADM

## 2022-05-26 RX ORDER — FLUDROCORTISONE ACETATE 0.1 MG/1
0.1 TABLET ORAL DAILY
Qty: 90 TABLET | Refills: 3 | Status: SHIPPED | OUTPATIENT
Start: 2022-05-26

## 2022-05-26 RX ORDER — VITAMIN B COMPLEX
1 CAPSULE ORAL DAILY
COMMUNITY

## 2022-05-26 RX ORDER — SODIUM CHLORIDE 9 MG/ML
75 INJECTION, SOLUTION INTRAVENOUS CONTINUOUS
Status: DISCONTINUED | OUTPATIENT
Start: 2022-05-26 | End: 2022-05-27 | Stop reason: HOSPADM

## 2022-05-26 RX ORDER — SODIUM CHLORIDE 0.9 % (FLUSH) 0.9 %
10 SYRINGE (ML) INJECTION EVERY 12 HOURS SCHEDULED
Status: DISCONTINUED | OUTPATIENT
Start: 2022-05-26 | End: 2022-05-26 | Stop reason: HOSPADM

## 2022-05-26 RX ADMIN — SODIUM CHLORIDE 1000 ML: 9 INJECTION, SOLUTION INTRAVENOUS at 09:57

## 2022-05-26 RX ADMIN — NITROGLYCERIN 0.4 MG: 0.4 TABLET SUBLINGUAL at 08:54

## 2022-06-07 ENCOUNTER — OFFICE VISIT (OUTPATIENT)
Dept: CARDIOLOGY | Facility: CLINIC | Age: 22
End: 2022-06-07

## 2022-06-07 VITALS
HEIGHT: 62 IN | DIASTOLIC BLOOD PRESSURE: 80 MMHG | SYSTOLIC BLOOD PRESSURE: 116 MMHG | BODY MASS INDEX: 40.67 KG/M2 | WEIGHT: 221 LBS | HEART RATE: 93 BPM

## 2022-06-07 DIAGNOSIS — R42 DIZZINESS: ICD-10-CM

## 2022-06-07 DIAGNOSIS — G90.9 AUTONOMIC DYSFUNCTION: Primary | ICD-10-CM

## 2022-06-07 PROCEDURE — 99213 OFFICE O/P EST LOW 20 MIN: CPT | Performed by: INTERNAL MEDICINE

## 2022-06-07 NOTE — PROGRESS NOTES
Subjective:     Encounter Date:06/07/22      Patient ID: Carmita Reyna is a 21 y.o. female.    Chief Complaint:  History of Present Illness    Dear Britt,    I had the pleasure of seeing this patient in the office today for follow-up after recent tilt table test.  She was referred to us for presyncope and dizziness.    After her initial evaluation we arrange for a Holter monitor and echo.  Echocardiogram was normal, Holter monitor showed a tendency toward sinus tachycardia with an average heart rate of 98 bpm.  No other arrhythmia was seen.  We then performed a tilt table test that showed increase in heart rate did not did not strictly meet criteria for POTS syndrome.  Additionally she had a drop in blood pressure and had near syncope on the tilt table test.  This appeared to be most consistent with a combined autonomic dysfunction with both tendency for excessive tachycardia along with vasodepressive pathology.  After that we started on fludrocortisone, she was started on this about 12 days ago and comes in today for follow-up.    Patient states that she is feeling better.  She is having a lot less dizziness.  She states that actually the mornings have now really been much better and then as the day goes on she starts noticing some symptoms again but they were not as bad.  Her dizziness is much improved, she still having a sensation of tachycardia.    Patient has had essentially lifelong history of symptoms.  She was apparently seen by Dr. Nichols as a pediatric cardiologist and was diagnosed with they believe with a vasovagal syncope.  I do not have any records from there.  They apparently did an echocardiogram, some other evaluation, and then she was on fludrocortisone apparently for years but at some point that stopped.    Her mother is with her today and relates that around the start of college she seemed to be doing a lot better but then started having more issues lately.  She did have COVID in December and  "since then has been having a lot more problems.    She states that anytime that she is laying down if she sits up fast she is gets lightheaded.  If she stands up fast she gets even more lightheaded.  Many times she will get some tunnel vision.  She has coat  discomfort of her posterior shoulder and posterior neck when she is standing up (indicative of the lower blood pressure). She says that when she had orthostatic blood pressures checked at that that made her feel bad and unsteady.  She frequently will feel that her heart rate goes up and beats hard and fast when she stands up.  She does not think she is ever completely blacked out but she is gotten real close.  Many times she has to quickly lie down to keep from blacking out, that is what she typically does so that she does not blackout.  She is never fallen and injured her self.  She has been have a lot of problems with diaphoresis with this.  It is really hard her take a hot shower because she gets very lightheaded and feels like she could pass out so she has to make sure that the temperature in the shower is pretty cool.  She drinks water all the time and states that she is always very thirsty, she does not deliberately add salt but she does not try to limit the salt in her diet.  She has been gaining weight.  She just had a visit with her OB/GYN and they have sent blood work on her which I have reviewed.    She was seen by Dr. King at [facility] in 2020 and she wore a Holter monitor at that time with an average heart rate of 107 bpm.  She was told that there were no rhythm issues at that point.    The following portions of the patient's history were reviewed and updated as appropriate: allergies, current medications, past family history, past medical history, past social history, past surgical history and problem list.    Procedures       Objective:     Vitals:    06/07/22 1310   BP: 116/80   Pulse: 93   Weight: 100 kg (221 lb)   Height: 157.5 cm (62\") "     Body mass index is 40.42 kg/m².      Vitals reviewed.   Constitutional:       General: Not in acute distress.     Appearance: Well-developed. Not diaphoretic.   Eyes:      General:         Right eye: No discharge.         Left eye: No discharge.      Conjunctiva/sclera: Conjunctivae normal.      Pupils: Pupils are equal, round, and reactive to light.   HENT:      Head: Normocephalic and atraumatic.      Nose: Nose normal.   Neck:      Thyroid: No thyromegaly.      Trachea: No tracheal deviation.      Lymphadenopathy: No cervical adenopathy.   Pulmonary:      Effort: Pulmonary effort is normal. No respiratory distress.      Breath sounds: Normal breath sounds. No stridor.   Chest:      Chest wall: Not tender to palpatation.   Cardiovascular:      Normal rate. Regular rhythm.      Murmurs: There is no murmur.      . No S3 gallop. No click. No rub.   Pulses:     Intact distal pulses.   Abdominal:      General: Bowel sounds are normal. There is no distension.      Palpations: Abdomen is soft. There is no abdominal mass.      Tenderness: There is no abdominal tenderness. There is no guarding or rebound.   Musculoskeletal: Normal range of motion.         General: No tenderness or deformity.      Cervical back: Normal range of motion and neck supple. Skin:     General: Skin is warm and dry.      Findings: No erythema or rash.   Neurological:      Mental Status: Alert and oriented to person, place, and time.      Deep Tendon Reflexes: Reflexes are normal and symmetric.   Psychiatric:         Thought Content: Thought content normal.         Data and records reviewed:     Lab Results   Component Value Date    BUN 12 05/20/2020    CREATININE 0.8 05/20/2020    BCR 15.0 05/20/2020    K 4.4 05/20/2020    CO2 25 05/20/2020    CALCIUM 9.6 05/20/2020    ALBUMIN 4.3 05/20/2020    LABIL2 1.4 05/20/2020    AST 32 05/20/2020    ALT 22 05/20/2020     No results found for: CHOL  No results found for: TRIG  No results found for:  HDL  No results found for: LDL  No results found for: VLDL  No results found for: LDLHDL    No radiology results for the last 90 days.  Results for orders placed during the hospital encounter of 05/16/22    Adult Transthoracic Echo Complete W/ Cont if Necessary Per Protocol    Interpretation Summary  · Calculated left ventricular EF = 59.7% Estimated left ventricular EF was in agreement with the calculated left ventricular EF. Left ventricular systolic function is normal. Normal left ventricular cavity size and wall thickness noted. All left ventricular wall segments contract normally. Left ventricular diastolic function was normal.  · Normal echocardiogram.          Assessment:          Diagnosis Plan   1. Autonomic dysfunction     2. Dizziness            Plan:       1.  Autonomic dysfunction-patient is having symptomatic autonomic dysfunction.  Evaluation as noted above is consistent with both vasodepressor as well as a excessive tachycardic component.  She does not meet strict criteria for POTS syndrome.  I started on fludrocortisone and she is feeling better but is still having some symptoms.  I have asked her to give it about another week while she continues to focus on hydration and additional salt in her diet as she been doing.  She will then touch base back with me, if she continues have symptoms anticipate that we will discontinue the as needed propranolol that she was given years ago (she rarely takes it) and I would switch her over to metoprolol succinate 25 mg once daily in combination with the fludrocortisone.    Thank you very much for allowing us to participate in the care of this pleasant patient.  Please don't hesitate to call if I can be of assistance in any way.      Current Outpatient Medications:   •  B Complex Vitamins (vitamin b complex) capsule capsule, Take 1 capsule by mouth Daily., Disp: , Rfl:   •  busPIRone (BUSPAR) 30 MG tablet, Take 30 mg by mouth 2 (Two) Times a Day., Disp: , Rfl:   •   fludrocortisone 0.1 MG tablet, Take 1 tablet by mouth Daily., Disp: 90 tablet, Rfl: 3  •  LORazepam (ATIVAN) 0.5 MG tablet, Take 0.5 mg by mouth 2 (Two) Times a Day As Needed. for anxiety, Disp: , Rfl:   •  ondansetron (Zofran) 4 MG tablet, Take 1 tablet by mouth Every 8 (Eight) Hours As Needed for Nausea or Vomiting., Disp: 30 tablet, Rfl: 0  •  Probiotic Product (PROBIOTIC DAILY PO), Take 1 tablet by mouth Daily., Disp: , Rfl:   •  propranolol (INDERAL) 10 MG tablet, Take 10 mg by mouth 2 (Two) Times a Day As Needed. for anxiety, Disp: , Rfl:   •  Vyvanse 30 MG capsule, Take 30 mg by mouth Every Morning, Disp: , Rfl:          No follow-ups on file.

## 2022-06-21 DIAGNOSIS — G90.9 AUTONOMIC DYSFUNCTION: Primary | ICD-10-CM

## 2022-06-21 RX ORDER — METOPROLOL SUCCINATE 25 MG/1
25 TABLET, EXTENDED RELEASE ORAL DAILY
Qty: 90 TABLET | Refills: 3 | Status: SHIPPED | OUTPATIENT
Start: 2022-06-21 | End: 2022-07-13 | Stop reason: SDUPTHER

## 2022-07-13 DIAGNOSIS — G90.9 AUTONOMIC DYSFUNCTION: ICD-10-CM

## 2022-07-13 RX ORDER — METOPROLOL SUCCINATE 50 MG/1
50 TABLET, EXTENDED RELEASE ORAL DAILY
Qty: 90 TABLET | Refills: 1 | Status: SHIPPED | OUTPATIENT
Start: 2022-07-13 | End: 2023-01-14 | Stop reason: SDUPTHER

## 2022-08-05 ENCOUNTER — OFFICE VISIT (OUTPATIENT)
Dept: FAMILY MEDICINE CLINIC | Facility: CLINIC | Age: 22
End: 2022-08-05

## 2022-08-05 VITALS
HEIGHT: 62 IN | BODY MASS INDEX: 39.38 KG/M2 | OXYGEN SATURATION: 98 % | TEMPERATURE: 96.9 F | SYSTOLIC BLOOD PRESSURE: 124 MMHG | DIASTOLIC BLOOD PRESSURE: 82 MMHG | WEIGHT: 214 LBS | HEART RATE: 60 BPM

## 2022-08-05 DIAGNOSIS — R53.83 FATIGUE, UNSPECIFIED TYPE: Primary | ICD-10-CM

## 2022-08-05 DIAGNOSIS — M25.50 DIFFUSE ARTHRALGIA: ICD-10-CM

## 2022-08-05 DIAGNOSIS — U09.9 POST-COVID SYNDROME: ICD-10-CM

## 2022-08-05 DIAGNOSIS — G90.9 AUTONOMIC DYSFUNCTION: ICD-10-CM

## 2022-08-05 PROCEDURE — 99214 OFFICE O/P EST MOD 30 MIN: CPT | Performed by: NURSE PRACTITIONER

## 2022-08-05 RX ORDER — LACTIC ACID, L-, CITRIC ACID MONOHYDRATE, AND POTASSIUM BITARTRATE 90; 50; 20 MG/5G; MG/5G; MG/5G
GEL VAGINAL
COMMUNITY
Start: 2022-05-14

## 2022-08-05 RX ORDER — LISDEXAMFETAMINE DIMESYLATE 40 MG/1
40 CAPSULE ORAL EVERY MORNING
COMMUNITY
Start: 2022-08-01 | End: 2022-11-16

## 2022-08-05 RX ORDER — TRAZODONE HYDROCHLORIDE 50 MG/1
25-50 TABLET ORAL NIGHTLY PRN
COMMUNITY
Start: 2022-07-29 | End: 2023-02-08 | Stop reason: DRUGHIGH

## 2022-08-05 NOTE — PROGRESS NOTES
"Chief Complaint     The patient presents for post Covid-19 issues.    She consents to ALLISON.    Hot Flashes (C/o hot flashes, loss of appetite, diarrhea, brain fog, dizziness, ringing in ears, nausea, salt craving, post COVID not getting better )    Subjective        Carmita Reyna presents to [facility]  History of Present Illness     The patient notes concerns of having an autoimmune condition because of her energy level. She states that she is feeling \"generally unwell\" and \"hung over.\" The patient reports that this is an ongoing issue. She states that since having COVID-19, her symptoms have worsened, noting that she is experiencing dizziness and hot flashes. The patient explains that she will be hot and cold, and have cold sweats.     She notes that she has a decreased appetite and losing weight. The patient explains that she does not eat, and that \"food grosses me out.\"  She reports having diarrhea every morning since having COVID-19.  The patient explains that she is having very loose stools, straight liquid. She notes that she did experience diarrhea up to 4 times a day, but now loose stool once in am. The patient had COVID-19, 3 weeks ago. She has been taking a probiotic since prior to having COVID-19.     The patient saw her cardiologist in 06/2022. She is taking fludrocortisone and metoprolol. The patient notes that her dizziness and heart rate are improving since starting the medication. She states that she was feeling better prior to having COVID-19. The patient notes that since having COVID-19, she will experience chest pain. She denies talking to Dr. Nolasco about her chest pains. The patient notes that she had a follow-up visit with Dr. Nolasco a few weeks ago. She reports having chest pain at night, when standing or lying down. The patient denies having a tightness in the chest. She describes the pain as a heaviness. The patient reports that the intermittent chest pain will " "last only a few seconds. She denies heart palpitations. The patient states that she always feels that her heart rate is \"strange.\"     She explains since having her symptoms, she is having a difficult time at work and getting through the day. The patient notes that she has decrease energy level. She reports that stress will exacerbate her symptoms.     The patient is interested in having blood work completed. She inquired about going to the [facility]. The patient states the she has read information about Addsion's disease and inquired about if she may have this. She states that when she gets stressed, she \"can not do life.\"     She reports that she thought she weighed more than 221 pounds. The patient inquired about a correct weight. She is interested in having her cortisol checked. Thought she had lost more weight. The patient states that she will get anxious over the smallest things.     The patient inquired about her joint pain and having arthritis. Has chronic intermittent arthralgia in hands, feet, knees. No erythema.     She explains continued congestion since having COVID-19.     She reports that her mom has a rare autoimmune condition. Her mom and sister have arthritis. Pt would like a full autoimmune work up and then will consider [facility].       Objective   Vital Signs:  /82 (BP Location: Right arm, Patient Position: Sitting, Cuff Size: Adult)   Pulse 60   Temp 96.9 °F (36.1 °C)   Ht 157.5 cm (62\")   Wt 97.1 kg (214 lb)   SpO2 98%   BMI 39.14 kg/m²   Estimated body mass index is 39.14 kg/m² as calculated from the following:    Height as of this encounter: 157.5 cm (62\").    Weight as of this encounter: 97.1 kg (214 lb).    Class 2 Severe Obesity (BMI >=35 and <=39.9). Obesity-related health conditions include the following: none. Obesity is unchanged. BMI is is above average; BMI management plan is completed. We discussed portion control and increasing exercise.      Physical " Exam  Vitals and nursing note reviewed.   Constitutional:       General: She is not in acute distress.     Appearance: She is well-developed. She is not ill-appearing or diaphoretic.   HENT:      Head: Normocephalic and atraumatic.   Eyes:      General:         Right eye: No discharge.         Left eye: No discharge.      Conjunctiva/sclera: Conjunctivae normal.   Cardiovascular:      Rate and Rhythm: Normal rate and regular rhythm.      Heart sounds: Normal heart sounds.   Pulmonary:      Effort: Pulmonary effort is normal.      Breath sounds: Normal breath sounds.   Abdominal:      General: Bowel sounds are normal.      Palpations: Abdomen is soft.      Tenderness: There is no abdominal tenderness.   Musculoskeletal:         General: No deformity.      Comments: Gait smooth and steady  No joint deformities noted   Skin:     General: Skin is warm and dry.   Neurological:      General: No focal deficit present.      Mental Status: She is alert and oriented to person, place, and time.   Psychiatric:         Attention and Perception: Perception normal.         Mood and Affect: Affect normal. Mood is anxious.         Speech: Speech normal.         Behavior: Behavior normal. Behavior is cooperative.         Thought Content: Thought content normal.         Cognition and Memory: Cognition normal.        Result Review :                Assessment and Plan    Diarrhea  - Advised to not take the probiotics for a while to see if loose stools improve.     Fatigue:   - Will order blood work. We will check various autoimmune disorders. Discussed labs are not exact and sometimes can have false positive and negative results.  Will check cortisol.     Congestion  - Recommended for the patient to try Mucinex for the congestion.   - She will work on increasing her breathing and moving around.   - Advised to drink plenty of water.     Reviewed and discussed recent cardiology notes.   Reviewed recently done labs which were all grossly  normal. Discussed HLD.          Diagnoses and all orders for this visit:    1. Fatigue, unspecified type (Primary)  -     CRYSTAL Direct Reflex to 11 Biomarker; Future  -     Cortisol - AM; Future    2. Post-COVID syndrome    3. Autonomic dysfunction  -     CRYSTAL Direct Reflex to 11 Biomarker; Future  -     Rheumatoid Factor, Quant; Future  -     Cyclic Citrul Peptide Antibody, IgG / IgA; Future    4. Diffuse arthralgia  -     Rheumatoid Factor, Quant; Future  -     Cyclic Citrul Peptide Antibody, IgG / IgA; Future             Follow Up   No follow-ups on file.  Patient was given instructions and counseling regarding her condition or for health maintenance advice. Please see specific information pulled into the AVS if appropriate.       Transcribed from ambient dictation for SHANEKA Cooley by Awa Webb.  08/05/22   14:28 EDT    Patient verbalized consent to the visit recording.  I have personally performed the services described in this document as transcribed by the above individual, and it is both accurate and complete.  Awa Webb  8/8/2022  14:28 EDT

## 2022-08-22 ENCOUNTER — LAB (OUTPATIENT)
Dept: LAB | Facility: HOSPITAL | Age: 22
End: 2022-08-22

## 2022-08-22 PROCEDURE — 82533 TOTAL CORTISOL: CPT | Performed by: NURSE PRACTITIONER

## 2022-08-22 PROCEDURE — 86431 RHEUMATOID FACTOR QUANT: CPT | Performed by: NURSE PRACTITIONER

## 2022-08-22 PROCEDURE — 86200 CCP ANTIBODY: CPT | Performed by: NURSE PRACTITIONER

## 2022-08-22 PROCEDURE — 86038 ANTINUCLEAR ANTIBODIES: CPT | Performed by: NURSE PRACTITIONER

## 2022-09-01 ENCOUNTER — OFFICE VISIT (OUTPATIENT)
Dept: FAMILY MEDICINE CLINIC | Facility: CLINIC | Age: 22
End: 2022-09-01

## 2022-09-01 ENCOUNTER — TELEPHONE (OUTPATIENT)
Dept: CARDIOLOGY | Facility: CLINIC | Age: 22
End: 2022-09-01

## 2022-09-01 VITALS
TEMPERATURE: 97.6 F | DIASTOLIC BLOOD PRESSURE: 86 MMHG | RESPIRATION RATE: 14 BRPM | OXYGEN SATURATION: 99 % | HEART RATE: 84 BPM | WEIGHT: 214.07 LBS | HEIGHT: 62 IN | SYSTOLIC BLOOD PRESSURE: 132 MMHG | BODY MASS INDEX: 39.39 KG/M2

## 2022-09-01 DIAGNOSIS — H65.193 ACUTE EFFUSION OF BOTH MIDDLE EARS: Primary | ICD-10-CM

## 2022-09-01 DIAGNOSIS — R42 DIZZINESS: ICD-10-CM

## 2022-09-01 DIAGNOSIS — R42 VERTIGO: ICD-10-CM

## 2022-09-01 PROCEDURE — 99214 OFFICE O/P EST MOD 30 MIN: CPT | Performed by: FAMILY MEDICINE

## 2022-09-01 PROCEDURE — 87428 SARSCOV & INF VIR A&B AG IA: CPT | Performed by: FAMILY MEDICINE

## 2022-09-01 RX ORDER — PREDNISONE 20 MG/1
20 TABLET ORAL 2 TIMES DAILY
Qty: 10 TABLET | Refills: 0 | OUTPATIENT
Start: 2022-09-01 | End: 2022-10-29

## 2022-09-01 RX ORDER — MECLIZINE HCL 12.5 MG/1
12.5 TABLET ORAL 3 TIMES DAILY PRN
Qty: 30 TABLET | Refills: 0 | OUTPATIENT
Start: 2022-09-01 | End: 2022-10-29

## 2022-09-01 RX ORDER — AZITHROMYCIN 250 MG/1
TABLET, FILM COATED ORAL
Qty: 6 TABLET | Refills: 0 | OUTPATIENT
Start: 2022-09-01 | End: 2022-10-29

## 2022-09-01 NOTE — PROGRESS NOTES
Chief Complaint  Chills (Pt states started yesterday suddenly), Dizziness (Pt states started yesterday suddenly. Pt states right now room is spinning), Shortness of Breath (Pt states yesterday at work felt weird checked oxygen level and pulse and states her oxygen level was 83 and pulse 51. States today is normal. States tightness in chest. Tested herself for covid before coming today results were negative ), Headache, and Diarrhea (Pt states this morning had diarrhea)    Subjective        Carmita Reyna presents to [facility]  History of Present Illness    Carmita Reyna is a 21-year-old female who presents today for bilateral ear effusion and vertigo.    The patient states that she is experiencing heart palpitations. She reports that on 08/31/2022, she was sitting down at work, and she started seeing colors. She felt like she was going to faint and she had chills. She complains of a sore throat, congestion, and bilateral ear pain. She also has a dry cough. She denies having a fever. She notes that her bilateral hands and feet are cold. She currently has a headache. She states that she struggles with dizziness. She adds that the whole room is currently spinning and it feels like vertigo. She reports that her current dizziness is different than her previous episodes. She denies drainage down her throat. She also complains of diarrhea today. She is taking Mucinex for her congestion. She adds that she feels fatigued.    She reports that she tested positive for COVID-19 in 07/2022. She saw her primary physician SHANEKA Shah, on 08/11/2022. She denies taking any antibiotics. She adds that she took prednisone after rich COVID-19 in 2021. She complains of chest heaviness and tightness. She denies any wheezing. She reports an increased heart rate. She notes that on 08/31/2022 her oxygen saturation was at 83 percent and her heart rate was 50 beats per minute. She rechecked her heart  "rate and it was 125 beats per minute. She denies seeing her cardiologist, Dr. Nolasco about these issues. Her last appointment with Dr. Nolasco was in 06/2022. Her heart rate is 84 beats per minute today. She is currently taking metoprolol, and she is tolerating it well. She feels like her breathing and heart rate are not normal. She states that she takes a beta blocker in the evenings. She had 2 Holter monitors in the past.       Objective   Vital Signs:  /86   Pulse 84   Temp 97.6 °F (36.4 °C) (Infrared)   Resp 14   Ht 157.5 cm (62\")   Wt 97.1 kg (214 lb 1.1 oz)   SpO2 99%   BMI 39.15 kg/m²   Estimated body mass index is 39.15 kg/m² as calculated from the following:    Height as of this encounter: 157.5 cm (62\").    Weight as of this encounter: 97.1 kg (214 lb 1.1 oz).          Physical Exam  Constitutional:       General: She is not in acute distress.     Appearance: Normal appearance. She is well-developed.   HENT:      Head: Normocephalic and atraumatic.      Right Ear: A middle ear effusion is present.      Left Ear: A middle ear effusion is present.      Mouth/Throat:      Mouth: Mucous membranes are moist.   Eyes:      General:         Right eye: No discharge.         Left eye: No discharge.      Extraocular Movements: Extraocular movements intact.      Pupils: Pupils are equal, round, and reactive to light.   Cardiovascular:      Rate and Rhythm: Normal rate and regular rhythm.      Heart sounds: Normal heart sounds.   Pulmonary:      Effort: Pulmonary effort is normal.      Breath sounds: Normal breath sounds. No wheezing or rales.   Abdominal:      General: Bowel sounds are normal.      Palpations: Abdomen is soft. There is no mass.      Tenderness: There is no abdominal tenderness.   Musculoskeletal:      Cervical back: Normal range of motion and neck supple.      Right lower leg: No edema.      Left lower leg: No edema.   Lymphadenopathy:      Cervical: No cervical adenopathy.   Neurological: "      General: No focal deficit present.      Mental Status: She is alert and oriented to person, place, and time.          Result Review :      Data reviewed: Cardiology studies holter ,echo and [facility]              Assessment and Plan   Diagnoses and all orders for this visit:    1. Acute effusion of both middle ears (Primary)  - The patient was positive for COVID-19 in 07/2022. She is experiencing on and off congestion, chest heaviness, and chest tightness. She saw her primary physician on 08/11/2022. She denies any wheezing. She will start taking prednisone and azithromycin.   -     predniSONE (DELTASONE) 20 MG tablet; Take 1 tablet by mouth 2 (Two) Times a Day.  Dispense: 10 tablet; Refill: 0  -     azithromycin (Zithromax) 250 MG tablet; Take 2 tablets the first day, then 1 tablet daily for 4 days.  Dispense: 6 tablet; Refill: 0    2. Vertigo  -     meclizine (ANTIVERT) 12.5 MG tablet; Take 1 tablet by mouth 3 (Three) Times a Day As Needed for Dizziness.  Dispense: 30 tablet; Refill: 0    3. Dizziness  - She has a chronic history of dizziness. She had a Holter test done in 05/2022. She is seeing a cardiologist. She complains of a decreased heart rate on 08/31/2022. Her heart rate is 84 beats per minutes today. She is also on a beta blocker. She is advised to go to emergency room if her symptoms do not improve. She will also have a follow-up with her primary physician for further evaluation.  -     predniSONE (DELTASONE) 20 MG tablet; Take 1 tablet by mouth 2 (Two) Times a Day.  Dispense: 10 tablet; Refill: 0    4. Diarrhea  - She complains of on and off diarrhea for quite some time. She is advised to have a stool test done. She will follow up with primary care in 3 days.           Follow Up   No follow-ups on file.    Patient was given instructions and counseling regarding her condition or for health maintenance advice. Please see specific information pulled into the AVS if  appropriate.         Transcribed from ambient dictation for Bekah Hurd MD by Don Hartman.  09/01/22   12:35 EDT    Patient verbalized consent to the visit recording.

## 2022-09-01 NOTE — TELEPHONE ENCOUNTER
"Pt called to schedule an appt and was transferred to triage. Pt c/o fatigue, SOA, intermittent left sided chest tightness/discomfort, palpitations, dizziness, congestion, vision changes, near syncope, and diarrhea.  Her symptoms started yesterday.  She went home from work and check her VS.  HR= 50, SaO2 initally 83%.  When she took some deep breaths, her SaO2 increased to the 90's. Chest discomfort and SOA worse with standing and improve with rest.  She rates chest discomfort 6/10 on pain scale.  Currently denies having chest pain or SOA, but she is complaining of dizziness, palpitations and vision changes.  Pt reports dizziness is not unusual for her but this \"feels different\" like vertigo. For example she states when \"she looks at a tree it moves.\"  She also said she sees black spots in her left eye but this is not an unusual symptom for her.    Pt got a same day appt at her PCP's office with a different provider.  She was negative for COVID and flu.  They advised her to see her regular PCP on Tuesday next week and go to the ER for worsening symptoms.  Pt was prescribed prednisone, zithromax and meclizine.   Her VS at the office were temp= 97.6, HR= 84, BP= 132/86, SaO2= 99%.  Pt states she does not have a way to check her current VS.  She has not started the medications prescribed today.  She takes metoprolol 50mg and fludrocortisone 0.1mg.  She took both of these medications last night at 7pm    I advised her to keep her appt tomorrow with Dr. Nolasco and agreed with PCP to go to the ER with worsening symptoms.  Pt verbalized understanding and agrees with this plan.    Do you have any additional recommendations?    Thank you,     Abena Adams RN  [facility] [place] [place] Triage Department  "

## 2022-09-01 NOTE — TELEPHONE ENCOUNTER
Called pt and notified her of recommendations.  She verbalized understanding.    Abena Adams RN  [facility] [place] [facility] Department

## 2022-09-02 ENCOUNTER — OFFICE VISIT (OUTPATIENT)
Dept: CARDIOLOGY | Facility: CLINIC | Age: 22
End: 2022-09-02

## 2022-09-02 VITALS
HEART RATE: 87 BPM | WEIGHT: 211 LBS | HEIGHT: 62 IN | SYSTOLIC BLOOD PRESSURE: 134 MMHG | BODY MASS INDEX: 38.83 KG/M2 | DIASTOLIC BLOOD PRESSURE: 88 MMHG

## 2022-09-02 DIAGNOSIS — R42 DIZZINESS: ICD-10-CM

## 2022-09-02 DIAGNOSIS — Z86.16 HISTORY OF 2019 NOVEL CORONAVIRUS DISEASE (COVID-19): ICD-10-CM

## 2022-09-02 DIAGNOSIS — U09.9 LONG COVID: ICD-10-CM

## 2022-09-02 DIAGNOSIS — R00.2 PALPITATIONS: Primary | ICD-10-CM

## 2022-09-02 DIAGNOSIS — G90.9 AUTONOMIC DYSFUNCTION: ICD-10-CM

## 2022-09-02 PROCEDURE — 93000 ELECTROCARDIOGRAM COMPLETE: CPT | Performed by: INTERNAL MEDICINE

## 2022-09-02 PROCEDURE — 99214 OFFICE O/P EST MOD 30 MIN: CPT | Performed by: INTERNAL MEDICINE

## 2022-09-02 NOTE — PROGRESS NOTES
Subjective:     Encounter Date:09/02/22      Patient ID: Carmita Reyna is a 21 y.o. female.    Chief Complaint:  History of Present Illness    Dear Britt,    I had the pleasure of seeing this patient in the office today as a work in.  She called and said that she felt horrible.  She felt like her heart was jumping and racing on her.    We have seen her in the past for evaluation of possible POTS syndrome.  She has had COVID and had COVID in July.  As you know she also had COVID back in December.    She says about 2 days ago she started feeling the same way again.  She felt very congested, had brain fog, significant fatigue, has some chills but no fevers.  However she says she saw you yesterday and you tested her for COVID it was negative she she thought she must be having some problem with her heart.  She did not tell us that she had just seen you have been tested for COVID and when she called the office and those results were not back in epic at the time.    As above we saw her initially with concerns of possible POTS.  After her initial evaluation we arrange for a Holter monitor and echo.  Echocardiogram was normal, Holter monitor showed a tendency toward sinus tachycardia with an average heart rate of 98 bpm.  No other arrhythmia was seen.  We then performed a tilt table test that showed increase in heart rate did not did not strictly meet criteria for POTS syndrome.  Additionally she had a drop in blood pressure and had near syncope on the tilt table test.  This appeared to be most consistent with a combined autonomic dysfunction with both tendency for excessive tachycardia along with vasodepressive pathology.  After that we started on fludrocortisone, she was started on this about 12 days ago and comes in today for follow-up.    Patient has had essentially lifelong history of symptoms.  She was apparently seen by Dr. Nichols as a pediatric cardiologist and was diagnosed with they believe with a vasovagal  syncope.  I do not have any records from there.  They apparently did an echocardiogram, some other evaluation, and then she was on fludrocortisone apparently for years but at some point that stopped.      She states that anytime that she is laying down if she sits up fast she is gets lightheaded.  If she stands up fast she gets even more lightheaded.  Many times she will get some tunnel vision.  She has coat  discomfort of her posterior shoulder and posterior neck when she is standing up (indicative of the lower blood pressure). She says that when she had orthostatic blood pressures checked at that that made her feel bad and unsteady.  She frequently will feel that her heart rate goes up and beats hard and fast when she stands up.  She does not think she is ever completely blacked out but she is gotten real close.  Many times she has to quickly lie down to keep from blacking out, that is what she typically does so that she does not blackout.  She is never fallen and injured her self.  She has been have a lot of problems with diaphoresis with this.  It is really hard her take a hot shower because she gets very lightheaded and feels like she could pass out so she has to make sure that the temperature in the shower is pretty cool.  She drinks water all the time and states that she is always very thirsty, she does not deliberately add salt but she does not try to limit the salt in her diet.  She has been gaining weight.  She just had a visit with her OB/GYN and they have sent blood work on her which I have reviewed.    She was seen by Dr. King at [facility] in 2020 and she wore a Holter monitor at that time with an average heart rate of 107 bpm.  She was told that there were no rhythm issues at that point.    The following portions of the patient's history were reviewed and updated as appropriate: allergies, current medications, past family history, past medical history, past social history, past surgical history  "and problem list.      ECG 12 Lead    Date/Time: 9/2/2022 3:39 PM  Performed by: Francisco Nolasco III, MD  Authorized by: Francisco Nolasco III, MD   Comparison: compared with previous ECG   Similar to previous ECG  Rhythm: sinus rhythm  Rate: normal  Conduction: conduction normal  ST Segments: ST segments normal  T Waves: T waves normal  QRS axis: normal  Other: no other findings    Clinical impression: normal ECG               Objective:     Vitals:    09/02/22 1438   BP: 134/88   BP Location: Left arm   Patient Position: Sitting   Pulse: 87   Weight: 95.7 kg (211 lb)   Height: 157.5 cm (62\")     Body mass index is 38.59 kg/m².      Vitals reviewed.   Constitutional:       General: Not in acute distress.     Appearance: Well-developed. Not diaphoretic.   Eyes:      General:         Right eye: No discharge.         Left eye: No discharge.      Conjunctiva/sclera: Conjunctivae normal.      Pupils: Pupils are equal, round, and reactive to light.   HENT:      Head: Normocephalic and atraumatic.      Nose: Nose normal.   Neck:      Thyroid: No thyromegaly.      Trachea: No tracheal deviation.      Lymphadenopathy: No cervical adenopathy.   Pulmonary:      Effort: Pulmonary effort is normal. No respiratory distress.      Breath sounds: Normal breath sounds. No stridor.   Chest:      Chest wall: Not tender to palpatation.   Cardiovascular:      Normal rate. Regular rhythm.      Murmurs: There is no murmur.      . No S3 gallop. No click. No rub.   Pulses:     Intact distal pulses.   Abdominal:      General: Bowel sounds are normal. There is no distension.      Palpations: Abdomen is soft. There is no abdominal mass.      Tenderness: There is no abdominal tenderness. There is no guarding or rebound.   Musculoskeletal: Normal range of motion.         General: No tenderness or deformity.      Cervical back: Normal range of motion and neck supple. Skin:     General: Skin is warm and dry.      Findings: No erythema or rash. "   Neurological:      Mental Status: Alert and oriented to person, place, and time.      Deep Tendon Reflexes: Reflexes are normal and symmetric.   Psychiatric:         Thought Content: Thought content normal.         Data and records reviewed:     Lab Results   Component Value Date    GLUCOSE 91 07/11/2022    BUN 7 07/11/2022    CREATININE 0.82 07/11/2022    BCR 9 07/11/2022    K 3.9 07/11/2022    CO2 21 07/11/2022    CALCIUM 9.6 07/11/2022    PROTENTOTREF 6.9 07/11/2022    ALBUMIN 4.4 07/11/2022    LABIL2 1.8 07/11/2022    AST 20 07/11/2022    ALT 32 07/11/2022     No results found for: CHOL  Lab Results   Component Value Date    TRIG 127 07/11/2022     Lab Results   Component Value Date    HDL 41 07/11/2022     Lab Results   Component Value Date     (H) 07/11/2022     Lab Results   Component Value Date    VLDL 23 07/11/2022     Lab Results   Component Value Date    LDLHDL 2.9 07/11/2022     CBC    CBC 7/11/22   WBC 13.4 (A)   RBC 4.68   Hemoglobin 14.4   Hematocrit 43.7   MCV 93   MCH 30.8   MCHC 33.0   RDW 13.1   Platelets 360   (A) Abnormal value            No radiology results for the last 90 days.  Results for orders placed during the hospital encounter of 05/16/22    Adult Transthoracic Echo Complete W/ Cont if Necessary Per Protocol    Interpretation Summary  · Calculated left ventricular EF = 59.7% Estimated left ventricular EF was in agreement with the calculated left ventricular EF. Left ventricular systolic function is normal. Normal left ventricular cavity size and wall thickness noted. All left ventricular wall segments contract normally. Left ventricular diastolic function was normal.  · Normal echocardiogram.          Assessment:          Diagnosis Plan   1. Palpitations  ECG 12 Lead   2. History of 2019 novel coronavirus disease (COVID-19)  ECG 12 Lead   3. Long COVID  ECG 12 Lead   4. Dizziness  ECG 12 Lead   5. Autonomic dysfunction  ECG 12 Lead          Plan:       1.  Autonomic  dysfunction-patient is having symptomatic autonomic dysfunction.  Evaluation as noted above is consistent with both vasodepressor as well as a excessive tachycardic component.  Continue current medical therapy  2.  Constitutional symptoms-patient has diffuse constitutional symptoms started 2 days ago.  She feels like she has COVID again.  However she says she was tested yesterday was negative.  I explained that it is possible that the test simply not yet become positive and it would be prudent for her to stay hydrated, get plenty of rest, and recheck in the next day or 2.  3.  History of COVID with long COVID symptoms.    Thank you very much for allowing us to participate in the care of this pleasant patient.  Please don't hesitate to call if I can be of assistance in any way.      Current Outpatient Medications:   •  azithromycin (Zithromax) 250 MG tablet, Take 2 tablets the first day, then 1 tablet daily for 4 days., Disp: 6 tablet, Rfl: 0  •  B Complex Vitamins (vitamin b complex) capsule capsule, Take 1 capsule by mouth Daily., Disp: , Rfl:   •  busPIRone (BUSPAR) 30 MG tablet, Take 30 mg by mouth 2 (Two) Times a Day., Disp: , Rfl:   •  fludrocortisone 0.1 MG tablet, Take 1 tablet by mouth Daily., Disp: 90 tablet, Rfl: 3  •  LORazepam (ATIVAN) 0.5 MG tablet, Take 0.5 mg by mouth 2 (Two) Times a Day As Needed. for anxiety, Disp: , Rfl:   •  meclizine (ANTIVERT) 12.5 MG tablet, Take 1 tablet by mouth 3 (Three) Times a Day As Needed for Dizziness., Disp: 30 tablet, Rfl: 0  •  metoprolol succinate XL (TOPROL-XL) 50 MG 24 hr tablet, Take 1 tablet by mouth Daily., Disp: 90 tablet, Rfl: 1  •  Phexxi 1.8-1-0.4 % gel, , Disp: , Rfl:   •  predniSONE (DELTASONE) 20 MG tablet, Take 1 tablet by mouth 2 (Two) Times a Day., Disp: 10 tablet, Rfl: 0  •  traZODone (DESYREL) 50 MG tablet, Take 25-50 mg by mouth At Night As Needed. for sleep, Disp: , Rfl:   •  Vyvanse 40 MG capsule, Take 40 mg by mouth Every Morning, Disp: , Rfl:           No follow-ups on file.

## 2022-11-16 ENCOUNTER — APPOINTMENT (OUTPATIENT)
Dept: GENERAL RADIOLOGY | Facility: HOSPITAL | Age: 22
End: 2022-11-16

## 2022-11-16 PROCEDURE — 73610 X-RAY EXAM OF ANKLE: CPT

## 2022-11-16 PROCEDURE — 73610 X-RAY EXAM OF ANKLE: CPT | Performed by: FAMILY MEDICINE

## 2022-12-16 DIAGNOSIS — Z11.3 SCREEN FOR SEXUALLY TRANSMITTED DISEASES: Primary | ICD-10-CM

## 2022-12-27 ENCOUNTER — TELEPHONE (OUTPATIENT)
Dept: FAMILY MEDICINE CLINIC | Facility: CLINIC | Age: 22
End: 2022-12-27

## 2022-12-27 NOTE — TELEPHONE ENCOUNTER
Caller: Carmita Reyna    Relationship to patient: Self    Best call back number: [phone]     Patient is needing: PATIENT IS CALLING TO SCHEDULE LABS.    UNABLE TO WARM TRANSFER.    PLEASE ADVISE

## 2022-12-28 ENCOUNTER — TELEPHONE (OUTPATIENT)
Dept: FAMILY MEDICINE CLINIC | Facility: CLINIC | Age: 22
End: 2022-12-28

## 2022-12-28 NOTE — TELEPHONE ENCOUNTER
Hub staff attempted to follow warm transfer process and was unsuccessful     Caller: Carmita Reyna    Relationship to patient: Self    Best call back number: [phone]    Patient is needing: SCHEDULE LAB ONLY APPOINTMENT

## 2023-01-04 ENCOUNTER — OFFICE VISIT (OUTPATIENT)
Dept: FAMILY MEDICINE CLINIC | Facility: CLINIC | Age: 23
End: 2023-01-04
Payer: COMMERCIAL

## 2023-01-04 VITALS
OXYGEN SATURATION: 97 % | HEIGHT: 63 IN | DIASTOLIC BLOOD PRESSURE: 85 MMHG | TEMPERATURE: 97.7 F | BODY MASS INDEX: 37.2 KG/M2 | SYSTOLIC BLOOD PRESSURE: 131 MMHG | HEART RATE: 96 BPM

## 2023-01-04 DIAGNOSIS — J10.1 INFLUENZA A: Primary | ICD-10-CM

## 2023-01-04 LAB
EXPIRATION DATE: ABNORMAL
FLUAV AG UPPER RESP QL IA.RAPID: DETECTED
FLUBV AG UPPER RESP QL IA.RAPID: NOT DETECTED
INTERNAL CONTROL: ABNORMAL
Lab: ABNORMAL
SARS-COV-2 AG UPPER RESP QL IA.RAPID: NOT DETECTED

## 2023-01-04 PROCEDURE — 99213 OFFICE O/P EST LOW 20 MIN: CPT | Performed by: NURSE PRACTITIONER

## 2023-01-04 PROCEDURE — 87428 SARSCOV & INF VIR A&B AG IA: CPT | Performed by: NURSE PRACTITIONER

## 2023-01-04 RX ORDER — OSELTAMIVIR PHOSPHATE 75 MG/1
75 CAPSULE ORAL 2 TIMES DAILY
Qty: 10 CAPSULE | Refills: 0 | Status: SHIPPED | OUTPATIENT
Start: 2023-01-04 | End: 2023-01-09

## 2023-01-04 NOTE — PROGRESS NOTES
Chief Complaint  Fever (C/o fever 101.2, bodyaches, cough, chest congestion, runny nose, sinus pressure, headaches, achy throat, since Monday )    Subjective        Carmita Reyna presents to [facility]  History of Present Illness pt here for flu-like sx that began Sunday night.  Woke up Monday with achiness, chest congestion, runny nose, sore throat.  No ear pain.  Had fever of 103.7 yesterday.  This is first time she has not had flu vaccine.  Still feels bad today, but might be a little better.       Objective   Vital Signs:  /85   Pulse 96   Temp 97.7 °F (36.5 °C)   Ht 160 cm (63\")   SpO2 97%   BMI 37.20 kg/m²   Estimated body mass index is 37.2 kg/m² as calculated from the following:    Height as of this encounter: 160 cm (63\").    Weight as of 11/16/22: 95.3 kg (210 lb).    Class 2 Severe Obesity (BMI >=35 and <=39.9). Obesity-related health conditions include the following: none. Obesity is unchanged. BMI is is above average; BMI management plan is completed. We discussed portion control and increasing exercise.      Physical Exam  Vitals and nursing note reviewed.   Constitutional:       General: She is not in acute distress.     Appearance: She is well-developed. She is ill-appearing. She is not toxic-appearing or diaphoretic.   HENT:      Head: Normocephalic and atraumatic.      Right Ear: Tympanic membrane, ear canal and external ear normal.      Left Ear: Ear canal and external ear normal. A middle ear effusion is present. Tympanic membrane is not erythematous.      Mouth/Throat:      Mouth: Mucous membranes are moist.      Pharynx: Oropharynx is clear. No posterior oropharyngeal erythema.   Eyes:      General: No scleral icterus.        Right eye: No discharge.         Left eye: No discharge.      Conjunctiva/sclera: Conjunctivae normal.   Cardiovascular:      Rate and Rhythm: Normal rate and regular rhythm.      Heart sounds: Normal heart sounds.   Pulmonary:       Effort: Pulmonary effort is normal.      Breath sounds: Normal breath sounds.   Abdominal:      General: Bowel sounds are normal.      Palpations: Abdomen is soft.      Tenderness: There is no abdominal tenderness.   Musculoskeletal:         General: No deformity.      Cervical back: Neck supple.      Comments: Gait smooth and steady   Lymphadenopathy:      Cervical: No cervical adenopathy.   Skin:     General: Skin is warm and dry.   Neurological:      General: No focal deficit present.      Mental Status: She is alert and oriented to person, place, and time.   Psychiatric:         Mood and Affect: Mood normal.         Behavior: Behavior normal.        Result Review :                Assessment and Plan   Diagnoses and all orders for this visit:    1. Influenza A (Primary)  -     oseltamivir (Tamiflu) 75 MG capsule; Take 1 capsule by mouth 2 (Two) Times a Day for 5 days.  Dispense: 10 capsule; Refill: 0    positive flu A.  Pt would like tamiflu which we discussed.  Expected course, duration discussed.  Rest, hydration, OTC meds for sx relief.  If worsening or not recovering as discussed needs RTC or UC.          Follow Up   No follow-ups on file.  Patient was given instructions and counseling regarding her condition or for health maintenance advice. Please see specific information pulled into the AVS if appropriate.

## 2023-01-07 ENCOUNTER — TELEPHONE (OUTPATIENT)
Dept: CARDIOLOGY | Facility: CLINIC | Age: 23
End: 2023-01-07
Payer: COMMERCIAL

## 2023-01-07 NOTE — TELEPHONE ENCOUNTER
I was the on-call provider overnight.  Patient paged the service at 4:50 AM.  She said she had the flu last week.  She gets this \"weird\" sensation where she feels dizzy and cannot breathe for a second.  She then checks her heart rate which is normal, but her O2 sats is 81%.  She says it just lasts a second and then it goes away.  She has had 4 episodes.  I asked her if she is truly short of breath when she sees the 81% and she was not able to say that she was.  I wonder if her oxygen saturation machine is just not picking up an accurate O2 sat.  I reassured her if that she if she is not struggling to breathe right now, we will just continue to monitor her symptoms they may be related to recently having a viral infection.    Triage RN-can you please call and check on the patient on Monday.  Thanks

## 2023-01-09 NOTE — TELEPHONE ENCOUNTER
Patient said she is \"doing ok\" but still having the episodes. She said she has orthostatic hypotension so she is sure that is playing a role in her symptoms. However, she does not have a BP cuff to monitor her BP so she is unsure what it is running. She said she just feels like she is going to pass out a lot of the time and it can happen with rest. She said when she has these episodes her HR will go from 80 to 130. She was scheduled to see you on 1/30, but cancelled because you told her to FU with her PCP. She has an appointment with her PCP on 1/30 now. She said if you feel like she needs to be seen by our office again she will gladly make another appointment, but she just did not want to waste your time if you feel like this is something her PCP needs to address.     Lindsay Andrew RN  Triage [facility]

## 2023-01-09 NOTE — TELEPHONE ENCOUNTER
Left VM of recommendations and to call back with any further questions or concerns, allowed by verbal release form.     Lindsay Andrew RN  Triage [facility]

## 2023-01-11 ENCOUNTER — OFFICE VISIT (OUTPATIENT)
Dept: FAMILY MEDICINE CLINIC | Facility: CLINIC | Age: 23
End: 2023-01-11
Payer: COMMERCIAL

## 2023-01-11 VITALS
TEMPERATURE: 98 F | HEIGHT: 63 IN | DIASTOLIC BLOOD PRESSURE: 86 MMHG | HEART RATE: 95 BPM | OXYGEN SATURATION: 96 % | BODY MASS INDEX: 37.2 KG/M2 | SYSTOLIC BLOOD PRESSURE: 144 MMHG

## 2023-01-11 DIAGNOSIS — R42 DIZZINESS: Primary | ICD-10-CM

## 2023-01-11 PROCEDURE — 99214 OFFICE O/P EST MOD 30 MIN: CPT | Performed by: NURSE PRACTITIONER

## 2023-01-11 NOTE — PROGRESS NOTES
"Chief Complaint  Low Oxygen (C/o O2 SATS have been low, not feeling herself, states cardiology said to f/u w/ pcp )    Subjective        Carmita Reyna presents to [facility]  History of Present Illness     HPI  The patient presents today for low oxygen saturation.    Low oxygen saturation  The patient states that she saw a different doctor here because I was not available.  This occurred this past September.  She was seen by another provider for bilateral ear effusion and vertigo.  This began after COVID in July.  Patient was also noted to have elevated heart rate.    She was seen by cardiology and has had work-up by Dr. Nolasco.  She was started on beta-blocker and has had normal Holter monitors.  She was told that her heart rate tends to run on the high side but that her heart health is good.    Since then patient was seen by me about a week ago for flu.  Started feeling better but then started feeling \"weird again.    This past weekend she felt like there was a frog in her chest, chest heaviness that occurred when she was just lying in bed.  She feels like she can breathe well but it just feels like she is not getting enough air.  She used a pulse ox and initially her oxygen was good but then dropped to 85%.  She became alarmed and called to cardiology.  She was referred back to me for further evaluation.    Patient in general just does not feel well.  She does not feel true vertigo but feels like especially in her left eye she has some changes in her vision and a vague sensation of may be lightheadedness.  It has been going on and off.  Does exacerbate her already high anxiety.    She is followed by psychiatry and is currently on Vyvanse, BuSpar, Ativan.  She does feel the Vyvanse improves her anxiety overall but thinks it could exacerbate her anxiety a little bit.  She is not noticed definite dizziness with the BuSpar but was told it could cause dizziness.  She did take an Ativan this " "past weekend with some relief from her symptoms.    She admits today that she has been using marijuana regularly.  Smoking about a gram per day which she recently stopped because she is afraid she is going to die.  It did seem to help manage her anxiety.    She has been constantly researching on the Internet to see what could be wrong with her heart.  She feels that she would be much better if she could get an MRI to make sure there is nothing ongoing with her brain.  Has felt mildly chronically headachy in general.  No acute headache.      Vitamin B12 deficiency  The patient reports that she is taking vitamin B12 and vitamin D supplements daily. She states that she is going to do an STD test today.    The following portions of the patient's history were reviewed and updated as appropriate: allergies, current medications, past family history, past medical history, past social history, past surgical history and problem list.      Objective   Vital Signs:  /86   Pulse 95   Temp 98 °F (36.7 °C)   Ht 160 cm (63\")   SpO2 96%   BMI 37.20 kg/m²   Estimated body mass index is 37.2 kg/m² as calculated from the following:    Height as of this encounter: 160 cm (63\").    Weight as of 11/16/22: 95.3 kg (210 lb).       Class 2 Severe Obesity (BMI >=35 and <=39.9). Obesity-related health conditions include the following: none. Obesity is unchanged. BMI is is above average; BMI management plan is completed. We discussed portion control and increasing exercise.      Physical Exam  Vitals and nursing note reviewed.   Constitutional:       General: She is not in acute distress.     Appearance: She is well-developed. She is not ill-appearing or diaphoretic.      Comments: Well-dressed and well-appearing   HENT:      Head: Normocephalic and atraumatic.   Eyes:      General:         Right eye: No discharge.         Left eye: No discharge.      Conjunctiva/sclera: Conjunctivae normal.   Cardiovascular:      Rate and Rhythm: " Normal rate and regular rhythm.      Heart sounds: Normal heart sounds.   Pulmonary:      Effort: Pulmonary effort is normal.      Breath sounds: Normal breath sounds.   Abdominal:      General: Bowel sounds are normal.      Palpations: Abdomen is soft.      Tenderness: There is no abdominal tenderness.   Musculoskeletal:         General: No deformity.      Cervical back: Neck supple.      Comments: Gait smooth and steady   Lymphadenopathy:      Cervical: No cervical adenopathy.   Skin:     General: Skin is warm and dry.   Neurological:      General: No focal deficit present.      Mental Status: She is alert and oriented to person, place, and time.   Psychiatric:         Attention and Perception: Attention and perception normal.         Mood and Affect: Mood is anxious. Affect is tearful.         Behavior: Behavior is cooperative.      Comments: Difficulty putting feelings into words and describing her symptoms        Result Review :                   Assessment and Plan   Diagnoses and all orders for this visit:    1. Dizziness (Primary)  -     Vitamin B12  -     Folate  -     CBC & Differential  -     Comprehensive Metabolic Panel      1. Dizziness  - The patient was advised to keep a journal of her symptoms.  I think unlikely due to heart.  We reviewed and discussed cardiology notes, work-up previously.  We will get labs today.  Could consider referral to [facility] for evaluation for possible dizziness although it is difficult to tell if this is really what she is having.  I think likely most of this is related to her anxiety which is probably been exacerbated by stopping marijuana.  Recommend discussing with psychiatry side effects of any of her medications to see if possibly we need better management.  I have for sure asked patient to stop reading and let us things on the Internet about her symptoms.  This is likely triggering her anxiety.  She agrees that it is making her more anxious.    We  discussed pulse oximetry and proper use and reading.  I do not know why she would need to use one and would recommend avoiding it.    If labs do not show etiology of her symptoms it is reasonable to consider an MRI as next option.  We had initially discussed that but patient preferred to avoid due to cost I believe.  She now feels like it would be helpful neck steps in evaluating her symptoms.    If any worsening patient has been directed to ER    Exam was grossly normal other than mood.           Follow Up   No follow-ups on file.  Patient was given instructions and counseling regarding her condition or for health maintenance advice. Please see specific information pulled into the AVS if appropriate.     Transcribed from ambient dictation for SHANEKA Cooley by Devorah Hutton.  01/11/23   15:52 EST    Patient or patient representative verbalized consent to the visit recording.  I have personally performed the services described in this document as transcribed by the above individual, and it is both accurate and complete.

## 2023-01-13 DIAGNOSIS — R42 DIZZINESS: Primary | ICD-10-CM

## 2023-01-13 LAB
ALBUMIN SERPL-MCNC: 4.7 G/DL (ref 3.5–5.2)
ALBUMIN/GLOB SERPL: 2 G/DL
ALP SERPL-CCNC: 80 U/L (ref 39–117)
ALT SERPL-CCNC: 19 U/L (ref 1–33)
AST SERPL-CCNC: 16 U/L (ref 1–32)
BASOPHILS # BLD AUTO: 0.03 10*3/MM3 (ref 0–0.2)
BASOPHILS NFR BLD AUTO: 0.3 % (ref 0–1.5)
BILIRUB SERPL-MCNC: 0.2 MG/DL (ref 0–1.2)
BUN SERPL-MCNC: 7 MG/DL (ref 6–20)
BUN/CREAT SERPL: 9.7 (ref 7–25)
C TRACH RRNA SPEC QL NAA+PROBE: NEGATIVE
CALCIUM SERPL-MCNC: 9.5 MG/DL (ref 8.6–10.5)
CHLORIDE SERPL-SCNC: 102 MMOL/L (ref 98–107)
CO2 SERPL-SCNC: 25.3 MMOL/L (ref 22–29)
CREAT SERPL-MCNC: 0.72 MG/DL (ref 0.57–1)
EGFRCR SERPLBLD CKD-EPI 2021: 119.9 ML/MIN/1.73
EOSINOPHIL # BLD AUTO: 0.11 10*3/MM3 (ref 0–0.4)
EOSINOPHIL NFR BLD AUTO: 1 % (ref 0.3–6.2)
ERYTHROCYTE [DISTWIDTH] IN BLOOD BY AUTOMATED COUNT: 12.9 % (ref 12.3–15.4)
FOLATE SERPL-MCNC: 19.1 NG/ML (ref 4.78–24.2)
GLOBULIN SER CALC-MCNC: 2.4 GM/DL
GLUCOSE SERPL-MCNC: 82 MG/DL (ref 65–99)
HCT VFR BLD AUTO: 39.6 % (ref 34–46.6)
HGB BLD-MCNC: 13.4 G/DL (ref 12–15.9)
HIV 1+2 AB+HIV1 P24 AG SERPL QL IA: NON REACTIVE
IMM GRANULOCYTES # BLD AUTO: 0.04 10*3/MM3 (ref 0–0.05)
IMM GRANULOCYTES NFR BLD AUTO: 0.4 % (ref 0–0.5)
LYMPHOCYTES # BLD AUTO: 3.03 10*3/MM3 (ref 0.7–3.1)
LYMPHOCYTES NFR BLD AUTO: 28 % (ref 19.6–45.3)
MCH RBC QN AUTO: 31.2 PG (ref 26.6–33)
MCHC RBC AUTO-ENTMCNC: 33.8 G/DL (ref 31.5–35.7)
MCV RBC AUTO: 92.3 FL (ref 79–97)
MONOCYTES # BLD AUTO: 0.63 10*3/MM3 (ref 0.1–0.9)
MONOCYTES NFR BLD AUTO: 5.8 % (ref 5–12)
N GONORRHOEA RRNA SPEC QL NAA+PROBE: NEGATIVE
NEUTROPHILS # BLD AUTO: 6.98 10*3/MM3 (ref 1.7–7)
NEUTROPHILS NFR BLD AUTO: 64.5 % (ref 42.7–76)
NRBC BLD AUTO-RTO: 0 /100 WBC (ref 0–0.2)
PLATELET # BLD AUTO: 403 10*3/MM3 (ref 140–450)
POTASSIUM SERPL-SCNC: 4 MMOL/L (ref 3.5–5.2)
PROT SERPL-MCNC: 7.1 G/DL (ref 6–8.5)
RBC # BLD AUTO: 4.29 10*6/MM3 (ref 3.77–5.28)
RPR SER QL: NORMAL
SODIUM SERPL-SCNC: 140 MMOL/L (ref 136–145)
VIT B12 SERPL-MCNC: 1461 PG/ML (ref 211–946)
WBC # BLD AUTO: 10.82 10*3/MM3 (ref 3.4–10.8)

## 2023-01-14 DIAGNOSIS — G90.9 AUTONOMIC DYSFUNCTION: ICD-10-CM

## 2023-01-16 RX ORDER — METOPROLOL SUCCINATE 50 MG/1
50 TABLET, EXTENDED RELEASE ORAL DAILY
Qty: 90 TABLET | Refills: 1 | Status: SHIPPED | OUTPATIENT
Start: 2023-01-16

## 2023-02-02 ENCOUNTER — HOSPITAL ENCOUNTER (OUTPATIENT)
Dept: MRI IMAGING | Facility: HOSPITAL | Age: 23
Discharge: HOME | End: 2023-02-02
Admitting: NURSE PRACTITIONER
Payer: COMMERCIAL

## 2023-02-02 DIAGNOSIS — R42 DIZZINESS: ICD-10-CM

## 2023-02-02 PROCEDURE — 70553 MRI BRAIN STEM W/O & W/DYE: CPT

## 2023-02-02 PROCEDURE — A9577 INJ MULTIHANCE: HCPCS | Performed by: NURSE PRACTITIONER

## 2023-02-02 PROCEDURE — 0 GADOBENATE DIMEGLUMINE 529 MG/ML SOLUTION: Performed by: NURSE PRACTITIONER

## 2023-02-02 RX ADMIN — GADOBENATE DIMEGLUMINE 15 ML: 529 INJECTION, SOLUTION INTRAVENOUS at 17:33

## 2023-02-08 ENCOUNTER — OFFICE VISIT (OUTPATIENT)
Dept: FAMILY MEDICINE CLINIC | Facility: CLINIC | Age: 23
End: 2023-02-08
Payer: COMMERCIAL

## 2023-02-08 VITALS
SYSTOLIC BLOOD PRESSURE: 108 MMHG | WEIGHT: 202 LBS | HEIGHT: 63 IN | OXYGEN SATURATION: 100 % | HEART RATE: 81 BPM | BODY MASS INDEX: 35.79 KG/M2 | DIASTOLIC BLOOD PRESSURE: 76 MMHG | TEMPERATURE: 97.7 F

## 2023-02-08 DIAGNOSIS — J06.9 URI, ACUTE: Primary | ICD-10-CM

## 2023-02-08 PROCEDURE — 99213 OFFICE O/P EST LOW 20 MIN: CPT | Performed by: NURSE PRACTITIONER

## 2023-02-08 PROCEDURE — 87428 SARSCOV & INF VIR A&B AG IA: CPT | Performed by: NURSE PRACTITIONER

## 2023-02-08 RX ORDER — TRAZODONE HYDROCHLORIDE 100 MG/1
100 TABLET ORAL
COMMUNITY
Start: 2023-01-20

## 2023-02-08 NOTE — PROGRESS NOTES
"Chief Complaint  URI (C/o chest/nasal congestion, cough, sore throat, neck and headaches, ear fullness in both ears, since Sat)    Subjective        Carmita Reyna presents to [facility]  History of Present Illness patient is here today for cough with chest congestion, mild sore throat, bilateral ear fullness which is worse first thing in the morning and then seems to improve over the course of the day.  She has been around her grandfather who was diagnosed with pneumonia and wants to make sure she does not have pneumonia.  She has been having ear congestion for several weeks and these cold symptoms for the last several days.  She has no fever or chills.  She is taking OTC Sudafed and guaifenesin with some relief.      Objective   Vital Signs:  /76   Pulse 81   Temp 97.7 °F (36.5 °C)   Ht 160 cm (63\")   Wt 91.6 kg (202 lb)   SpO2 100%   BMI 35.78 kg/m²   Estimated body mass index is 35.78 kg/m² as calculated from the following:    Height as of this encounter: 160 cm (63\").    Weight as of this encounter: 91.6 kg (202 lb).             Physical Exam  Vitals and nursing note reviewed.   Constitutional:       General: She is not in acute distress.     Appearance: She is well-developed. She is not ill-appearing or diaphoretic.   HENT:      Head: Normocephalic and atraumatic.      Right Ear: Tympanic membrane, ear canal and external ear normal.      Left Ear: Ear canal and external ear normal.      Ears:      Comments: Left TM is cloudy without erythema     Mouth/Throat:      Mouth: Mucous membranes are moist.      Pharynx: Posterior oropharyngeal erythema (She is a little bit of erythema left pharynx, no exudate) present.   Eyes:      General: No scleral icterus.        Right eye: No discharge.         Left eye: No discharge.      Conjunctiva/sclera: Conjunctivae normal.   Cardiovascular:      Rate and Rhythm: Normal rate and regular rhythm.      Heart sounds: Normal heart sounds. "   Pulmonary:      Effort: Pulmonary effort is normal.      Breath sounds: Normal breath sounds. No wheezing, rhonchi or rales.   Abdominal:      General: Bowel sounds are normal.      Palpations: Abdomen is soft.      Tenderness: There is no abdominal tenderness.   Musculoskeletal:         General: No deformity.      Cervical back: Neck supple.      Comments: Gait smooth and steady   Lymphadenopathy:      Cervical: No cervical adenopathy.   Skin:     General: Skin is warm and dry.   Neurological:      General: No focal deficit present.      Mental Status: She is alert and oriented to person, place, and time.   Psychiatric:         Mood and Affect: Mood normal.         Behavior: Behavior normal.        Result Review :                   Assessment and Plan   Diagnoses and all orders for this visit:    1. URI, acute (Primary)  -     POCT SARS-CoV-2 Antigen LARS    Flu and COVID are both negative.  We discussed management of ear congestion with Flonase, proper technique demonstrated.  We will have her do 2 squirts in each nostril twice daily x3 days then daily as needed.  Could continue Sudafed which seems to work well for head congestion.  Guaifenesin sometimes is better for chest congestion.  Plenty of water.  If not improving, certainly worsening will need return to clinic.         Follow Up   No follow-ups on file.  Patient was given instructions and counseling regarding her condition or for health maintenance advice. Please see specific information pulled into the AVS if appropriate.

## 2023-03-06 DIAGNOSIS — H65.193 ACUTE EFFUSION OF BOTH MIDDLE EARS: Primary | ICD-10-CM

## 2023-05-06 DIAGNOSIS — R55 SYNCOPE AND COLLAPSE: ICD-10-CM

## 2023-05-09 DIAGNOSIS — R55 SYNCOPE AND COLLAPSE: ICD-10-CM

## 2023-05-09 RX ORDER — TRAZODONE HYDROCHLORIDE 100 MG/1
100 TABLET ORAL
Qty: 90 TABLET | Refills: 1 | Status: SHIPPED | OUTPATIENT
Start: 2023-05-09

## 2023-05-09 RX ORDER — BUSPIRONE HYDROCHLORIDE 30 MG/1
30 TABLET ORAL 2 TIMES DAILY
Qty: 180 TABLET | Refills: 1 | Status: SHIPPED | OUTPATIENT
Start: 2023-05-09

## 2023-05-09 NOTE — TELEPHONE ENCOUNTER
Rx Refill Note  Requested Prescriptions     Pending Prescriptions Disp Refills   • busPIRone (BUSPAR) 30 MG tablet       Sig: Take 1 tablet by mouth 2 (Two) Times a Day.   • traZODone (DESYREL) 100 MG tablet       Sig: Take 1 tablet by mouth every night at bedtime.      Last office visit with prescribing clinician: 2/8/2023   Last telemedicine visit with prescribing clinician: 2/8/2023   Next office visit with prescribing clinician: Visit date not found                         Would you like a call back once the refill request has been completed: [] Yes [] No    If the office needs to give you a call back, can they leave a voicemail: [] Yes [] No    Gloria Stahl MA  05/09/23, 14:05 EDT

## 2023-05-10 RX ORDER — FLUDROCORTISONE ACETATE 0.1 MG/1
0.1 TABLET ORAL DAILY
Qty: 90 TABLET | Refills: 1 | Status: SHIPPED | OUTPATIENT
Start: 2023-05-10

## 2023-05-11 RX ORDER — FLUDROCORTISONE ACETATE 0.1 MG/1
0.1 TABLET ORAL DAILY
Qty: 90 TABLET | Refills: 0 | Status: SHIPPED | OUTPATIENT
Start: 2023-05-11

## 2023-06-19 DIAGNOSIS — R00.0 TACHYCARDIA: ICD-10-CM

## 2023-06-19 DIAGNOSIS — R55 SYNCOPE, UNSPECIFIED SYNCOPE TYPE: ICD-10-CM

## 2023-06-19 DIAGNOSIS — R42 DIZZINESS: Primary | ICD-10-CM

## 2024-06-20 ENCOUNTER — OFFICE VISIT (OUTPATIENT)
Dept: FAMILY MEDICINE CLINIC | Facility: CLINIC | Age: 24
End: 2024-06-20
Payer: COMMERCIAL

## 2024-06-20 VITALS
TEMPERATURE: 98.2 F | HEIGHT: 63 IN | WEIGHT: 215.5 LBS | SYSTOLIC BLOOD PRESSURE: 140 MMHG | HEART RATE: 115 BPM | DIASTOLIC BLOOD PRESSURE: 91 MMHG | BODY MASS INDEX: 38.18 KG/M2 | OXYGEN SATURATION: 99 %

## 2024-06-20 DIAGNOSIS — Z32.01 POSITIVE PREGNANCY TEST: Primary | ICD-10-CM

## 2024-06-20 LAB
B-HCG UR QL: POSITIVE
EXPIRATION DATE: ABNORMAL
INTERNAL NEGATIVE CONTROL: ABNORMAL
INTERNAL POSITIVE CONTROL: ABNORMAL
Lab: ABNORMAL

## 2024-06-20 PROCEDURE — 99213 OFFICE O/P EST LOW 20 MIN: CPT | Performed by: NURSE PRACTITIONER

## 2024-06-20 PROCEDURE — 81025 URINE PREGNANCY TEST: CPT | Performed by: NURSE PRACTITIONER

## 2024-06-20 RX ORDER — ATOMOXETINE 40 MG/1
40 CAPSULE ORAL DAILY
COMMUNITY

## 2024-06-20 RX ORDER — MULTIVIT-MIN/IRON/FOLIC ACID/K 18-600-40
CAPSULE ORAL
COMMUNITY

## 2024-06-20 RX ORDER — VENLAFAXINE HYDROCHLORIDE 150 MG/1
150 CAPSULE, EXTENDED RELEASE ORAL DAILY
COMMUNITY

## 2024-06-20 NOTE — PROGRESS NOTES
Chief Complaint  Routine Prenatal Visit (3 positive test ) and Nausea    Subjective        Carmita Reyna presents to [facility]  History of Present Illness    History of Present Illness  The patient is a 22-year-old female who presents for evaluation of pregnancy.    The patient suspects she may have Polycystic Ovary Syndrome (PCOS) and reports experiencing inflammation. She utilized a nonhormonal Phexxi for contraception and subsequently took a morning after pill after sex as they did not use a condom.   She reports morning nausea, tender breasts, and a delay in her menstrual cycle, with her last period commencing on 05/17/2024 at approximately.     She expresses a desire to maintain her pregnancy but expresses interest in understanding her options in [place].     She acknowledges having supportive family, including her parents and friends, but expresses apprehension about potential harm to her child without a father. She acknowledges her continued smoking and drinking habits and expresses concern about potential impact on her pregnancy-but has stopped since pregnancy. She has undergone three pregnancy tests which were all positive. She is not currently taking metoprolol, Vyvanse, ibuprofen, Sudafed, or BuSpar. Her current medication regimen includes Effexor and Strattera from psychiatry. She is also taking magnesium, 2000 International Units, LFTs, vitamin D, and vitamin B supplements, but is unsure of the appropriate supplements she can take during pregnancy. She acknowledges a history of low vitamin D levels, last checked in 01/2024, and inquires about the necessity of continuing vitamin D supplementation during pregnancy.     She plans to schedule a follow-up appointment with Dr. Nolasco, cardiology, due to low blood pressure, with a recent reading of 90/60, which she attributes to anxiety.     She has an OB-GYN, Dr. Rianna Kidd and Carolina Garcia at [facility] and plans to  "call to get an appt.         Objective   Vital Signs:  /91   Pulse 115   Temp 98.2 °F (36.8 °C) (Temporal)   Ht 160 cm (63\")   Wt 97.8 kg (215 lb 8 oz)   SpO2 99%   BMI 38.17 kg/m²   Estimated body mass index is 38.17 kg/m² as calculated from the following:    Height as of this encounter: 160 cm (63\").    Weight as of this encounter: 97.8 kg (215 lb 8 oz).       Class 2 Severe Obesity (BMI >=35 and <=39.9). Obesity-related health conditions include the following: none. Obesity is  stable . BMI is  n/a due to pregnancy . We discussed portion control, increasing exercise, and Information on healthy weight added to patient's after visit summary.      Physical Exam  Vitals and nursing note reviewed.   Constitutional:       General: She is not in acute distress.     Appearance: She is well-developed. She is not ill-appearing or diaphoretic.   HENT:      Head: Normocephalic and atraumatic.   Eyes:      General:         Right eye: No discharge.         Left eye: No discharge.      Conjunctiva/sclera: Conjunctivae normal.   Cardiovascular:      Rate and Rhythm: Normal rate and regular rhythm.      Heart sounds: Normal heart sounds.   Pulmonary:      Effort: Pulmonary effort is normal.      Breath sounds: Normal breath sounds.   Abdominal:      General: Bowel sounds are normal.      Palpations: Abdomen is soft.   Musculoskeletal:         General: No deformity.      Comments: Gait smooth and steady   Skin:     General: Skin is warm and dry.   Neurological:      Mental Status: She is alert and oriented to person, place, and time.   Psychiatric:         Mood and Affect: Mood normal.         Behavior: Behavior normal.         Thought Content: Thought content normal.          Physical Exam       Result Review :            Results  Laboratory Studies  Pregnancy test was positive.                Assessment and Plan     Diagnoses and all orders for this visit:    1. Positive pregnancy test (Primary)  -     POCT " pregnancy, urine        Assessment & Plan  1. Pregnancy.  Patient plans to call OB/GYN today to discuss options.  She would be probable higher risk pregnancy due to history of autonomic dysfunction, previously on meds which she is not currently taking.  .She was counseled to abstain from caffeine during pregnancy and to maintain adequate hydration. Over-the-counter Tylenol was recommended for pain management, but caution was advised against ibuprofen. Zyrtec was recommended for allergy management. The patient was advised to consume 1 or 2 crackers before getting out of bed for nausea, could also take vitamin B6 or Preggy pops and to engage in physical activity as tolerated. She was also advised to monitor her blood pressure and ensure adequate hydration. A follow-up appointment with Dr. Nolasco will be scheduled. If any trouble getting into OB or further assistance needed she will let me know.    She will stay on current meds and contact psychiatry for further direction on taking during pregnancy.             Follow Up     No follow-ups on file.  Patient was given instructions and counseling regarding her condition or for health maintenance advice. Please see specific information pulled into the AVS if appropriate.    Patient or patient representative verbalized consent for the use of Ambient Listening during the visit with  SHANEKA Cooley for chart documentation. 6/20/2024  19:22 EDT      Answers submitted by the patient for this visit:  Other (Submitted on 6/19/2024)  Please describe your symptoms.: I would like to start seeing Britt again as my primary care dr. I got labs done in january and my progesterine was low, cholesteral was high, folate was high, and vitamin D was low. I just want to see her thoughts/redo labs to make sure they’re normal  Have you had these symptoms before?: Yes  How long have you been having these symptoms?: Greater than 2 weeks  Please list any medications you are currently  taking for this condition.: Vitamin D supplement, bergamot, progesterine cream  Primary Reason for Visit (Submitted on 6/19/2024)  What is the primary reason for your visit?: Other

## 2024-06-21 ENCOUNTER — TELEPHONE (OUTPATIENT)
Dept: CARDIOLOGY | Facility: CLINIC | Age: 24
End: 2024-06-21
Payer: COMMERCIAL

## 2024-06-21 NOTE — TELEPHONE ENCOUNTER
Hub staff attempted to follow warm transfer process and was unsuccessful     Caller: Carmita Reyna    Relationship to patient: Self    Best call back number: [phone]    Patient is needing:         PT NEEDING A FOLLOW UP APPT. CURRENTLY PREGNANT. NEED ADVISEMENT ON SCHEDULING.

## 2024-07-01 ENCOUNTER — OFFICE VISIT (OUTPATIENT)
Dept: CARDIOLOGY | Facility: CLINIC | Age: 24
End: 2024-07-01
Payer: COMMERCIAL

## 2024-07-01 VITALS
HEART RATE: 95 BPM | OXYGEN SATURATION: 97 % | WEIGHT: 216 LBS | HEIGHT: 63 IN | SYSTOLIC BLOOD PRESSURE: 126 MMHG | BODY MASS INDEX: 38.27 KG/M2 | DIASTOLIC BLOOD PRESSURE: 82 MMHG | RESPIRATION RATE: 16 BRPM

## 2024-07-01 DIAGNOSIS — G90.9 AUTONOMIC DYSFUNCTION: Primary | ICD-10-CM

## 2024-07-01 DIAGNOSIS — Z3A.01 PREGNANCY WITH 6 COMPLETED WEEKS GESTATION: ICD-10-CM

## 2024-07-01 PROCEDURE — 99214 OFFICE O/P EST MOD 30 MIN: CPT | Performed by: INTERNAL MEDICINE

## 2024-07-01 PROCEDURE — 93000 ELECTROCARDIOGRAM COMPLETE: CPT | Performed by: INTERNAL MEDICINE

## 2024-07-01 RX ORDER — PRENATAL VIT/IRON FUM/FOLIC AC 27MG-0.8MG
TABLET ORAL DAILY
COMMUNITY

## 2024-07-01 NOTE — PROGRESS NOTES
Subjective:     Encounter Date:07/01/24      Patient ID: Carmita Reyna is a 23 y.o. female.    Chief Complaint:  History of Present Illness    Dear Britt,    I had the pleasure of seeing this patient in the office today for follow-up of her autonomic dysfunction.    She comes in because she just found out she is pregnant.  She is about 6 weeks pregnant at this point.    Off all of her medicines at this point.  Really doing pretty well.  A little bit of nausea, no vomiting, not have any difficulty keeping fluids down.    We have seen her in the past for evaluation of possible POTS syndrome.  At last saw her in 2022.  Initially we had her on fludrocortisone but she has been off that for a while.    As above we saw her initially with concerns of possible POTS.  After her initial evaluation we arrange for a Holter monitor and echo.  Echocardiogram was normal, Holter monitor showed a tendency toward sinus tachycardia with an average heart rate of 98 bpm.  No other arrhythmia was seen.  We then performed a tilt table test that showed increase in heart rate did not did not strictly meet criteria for POTS syndrome.  Additionally she had a drop in blood pressure and had near syncope on the tilt table test.  This appeared to be most consistent with a combined autonomic dysfunction with both tendency for excessive tachycardia along with vasodepressive pathology.  After that we started on fludrocortisone, she was started on this about 12 days ago and comes in today for follow-up.    Patient has had essentially lifelong history of symptoms.  She was apparently seen by Dr. Nichols as a pediatric cardiologist and was diagnosed with they believe with a vasovagal syncope.  I do not have any records from there.  They apparently did an echocardiogram, some other evaluation, and then she was on fludrocortisone apparently for years but at some point that stopped.    She was seen by Dr. King at [facility] in 2020 and she wore a Holter  "monitor at that time with an average heart rate of 107 bpm.  She was told that there were no rhythm issues at that point.    The following portions of the patient's history were reviewed and updated as appropriate: allergies, current medications, past family history, past medical history, past social history, past surgical history and problem list.      ECG 12 Lead    Date/Time: 7/1/2024 12:01 PM  Performed by: Francisco Nolasco III, MD    Authorized by: Francisco Nolasco III, MD  Comparison: compared with previous ECG   Similar to previous ECG  Rhythm: sinus rhythm  Rate: normal  Conduction: conduction normal  ST Segments: ST segments normal  T Waves: T waves normal  QRS axis: normal  Other: no other findings    Clinical impression: normal ECG             Objective:     Vitals:    07/01/24 1058   BP: 126/82   BP Location: Right arm   Patient Position: Sitting   Cuff Size: Adult   Pulse: 95   Resp: 16   SpO2: 97%   Weight: 98 kg (216 lb)   Height: 160 cm (63\")     Body mass index is 38.26 kg/m².      Vitals reviewed.   Constitutional:       General: Not in acute distress.     Appearance: Well-developed. Not diaphoretic.   Eyes:      General:         Right eye: No discharge.         Left eye: No discharge.      Conjunctiva/sclera: Conjunctivae normal.      Pupils: Pupils are equal, round, and reactive to light.   HENT:      Head: Normocephalic and atraumatic.      Nose: Nose normal.   Neck:      Thyroid: No thyromegaly.      Trachea: No tracheal deviation.      Lymphadenopathy: No cervical adenopathy.   Pulmonary:      Effort: Pulmonary effort is normal. No respiratory distress.      Breath sounds: Normal breath sounds. No stridor.   Chest:      Chest wall: Not tender to palpatation.   Cardiovascular:      Normal rate. Regular rhythm.      Murmurs: There is no murmur.      . No S3 gallop. No click. No rub.   Pulses:     Intact distal pulses.   Abdominal:      General: Bowel sounds are normal. There is no distension.      " "Palpations: Abdomen is soft. There is no abdominal mass.      Tenderness: There is no abdominal tenderness. There is no guarding or rebound.   Musculoskeletal: Normal range of motion.         General: No tenderness or deformity.      Cervical back: Normal range of motion and neck supple. Skin:     General: Skin is warm and dry.      Findings: No erythema or rash.   Neurological:      Mental Status: Alert and oriented to person, place, and time.      Deep Tendon Reflexes: Reflexes are normal and symmetric.   Psychiatric:         Thought Content: Thought content normal.         Data and records reviewed:     Lab Results   Component Value Date    GLUCOSE 82 01/11/2023    BUN 7 01/11/2023    CREATININE 0.72 01/11/2023    BCR 9.7 01/11/2023    K 4.0 01/11/2023    CO2 25.3 01/11/2023    CALCIUM 9.5 01/11/2023    PROTENTOTREF 7.1 01/11/2023    ALBUMIN 4.7 01/11/2023    LABIL2 2.0 01/11/2023    AST 16 01/11/2023    ALT 19 01/11/2023     No results found for: \"CHOL\"  Lab Results   Component Value Date    TRIG 127 07/11/2022     Lab Results   Component Value Date    HDL 41 07/11/2022     Lab Results   Component Value Date     (H) 07/11/2022     Lab Results   Component Value Date    VLDL 23 07/11/2022     Lab Results   Component Value Date    LDLHDL 2.9 07/11/2022         No radiology results for the last 90 days.  Results for orders placed during the hospital encounter of 05/16/22    Adult Transthoracic Echo Complete W/ Cont if Necessary Per Protocol    Interpretation Summary  · Calculated left ventricular EF = 59.7% Estimated left ventricular EF was in agreement with the calculated left ventricular EF. Left ventricular systolic function is normal. Normal left ventricular cavity size and wall thickness noted. All left ventricular wall segments contract normally. Left ventricular diastolic function was normal.  · Normal echocardiogram.          Assessment:          Diagnosis Plan   1. Autonomic dysfunction        2. " Pregnancy with 6 completed weeks gestation                 Plan:       1.  Autonomic dysfunction-currently minimally symptomatic, reviewed hydration strategies  2.  6 weeks pregnant.  3.  History of COVID with long COVID symptoms.  These have largely resolved.    I will plan on seeing her back about 3 months after delivery.    Thank you very much for allowing us to participate in the care of this pleasant patient.  Please don't hesitate to call if I can be of assistance in any way.      Current Outpatient Medications:     Prenatal Vit-Fe Fumarate-FA (prenatal vitamin 27-0.8) 27-0.8 MG tablet tablet, Take  by mouth Daily., Disp: , Rfl:          No follow-ups on file.

## 2024-07-11 LAB
EXTERNAL HEPATITIS B SURFACE ANTIGEN: NEGATIVE
EXTERNAL HEPATITIS C AB: NORMAL
EXTERNAL RUBELLA QUALITATIVE: NORMAL
EXTERNAL SYPHILIS RPR SCREEN: NORMAL

## 2024-12-03 ENCOUNTER — HOSPITAL ENCOUNTER (EMERGENCY)
Facility: HOSPITAL | Age: 24
Discharge: HOME | End: 2024-12-03
Attending: STUDENT IN AN ORGANIZED HEALTH CARE EDUCATION/TRAINING PROGRAM | Admitting: OBSTETRICS & GYNECOLOGY
Payer: COMMERCIAL

## 2024-12-03 VITALS
WEIGHT: 233 LBS | HEART RATE: 71 BPM | OXYGEN SATURATION: 99 % | BODY MASS INDEX: 41.27 KG/M2 | TEMPERATURE: 98.2 F | SYSTOLIC BLOOD PRESSURE: 119 MMHG | RESPIRATION RATE: 16 BRPM | DIASTOLIC BLOOD PRESSURE: 73 MMHG

## 2024-12-03 LAB
ALBUMIN SERPL-MCNC: 3.4 G/DL (ref 3.5–5.2)
ALBUMIN/GLOB SERPL: 1.3 G/DL
ALP SERPL-CCNC: 79 U/L (ref 39–117)
ALT SERPL W P-5'-P-CCNC: 20 U/L (ref 1–33)
ANION GAP SERPL CALCULATED.3IONS-SCNC: 12 MMOL/L (ref 5–15)
AST SERPL-CCNC: 10 U/L (ref 1–32)
BACTERIA UR QL AUTO: ABNORMAL /HPF
BASOPHILS # BLD AUTO: 0.01 10*3/MM3 (ref 0–0.2)
BASOPHILS NFR BLD AUTO: 0.1 % (ref 0–1.5)
BILIRUB SERPL-MCNC: <0.2 MG/DL (ref 0–1.2)
BILIRUB UR QL STRIP: NEGATIVE
BUN SERPL-MCNC: 8 MG/DL (ref 6–20)
BUN/CREAT SERPL: 11.9 (ref 7–25)
CALCIUM SPEC-SCNC: 9.8 MG/DL (ref 8.6–10.5)
CHLORIDE SERPL-SCNC: 102 MMOL/L (ref 98–107)
CLARITY UR: CLEAR
CO2 SERPL-SCNC: 22 MMOL/L (ref 22–29)
COLOR UR: YELLOW
CREAT SERPL-MCNC: 0.67 MG/DL (ref 0.57–1)
CREAT UR-MCNC: 78 MG/DL
DEPRECATED RDW RBC AUTO: 41.5 FL (ref 37–54)
EGFRCR SERPLBLD CKD-EPI 2021: 123.8 ML/MIN/1.73
EOSINOPHIL # BLD AUTO: 0.15 10*3/MM3 (ref 0–0.4)
EOSINOPHIL NFR BLD AUTO: 1.5 % (ref 0.3–6.2)
ERYTHROCYTE [DISTWIDTH] IN BLOOD BY AUTOMATED COUNT: 12.6 % (ref 12.3–15.4)
GLOBULIN UR ELPH-MCNC: 2.7 GM/DL
GLUCOSE SERPL-MCNC: 82 MG/DL (ref 65–99)
GLUCOSE UR STRIP-MCNC: NEGATIVE MG/DL
HCT VFR BLD AUTO: 31.8 % (ref 34–46.6)
HGB BLD-MCNC: 11 G/DL (ref 12–15.9)
HGB UR QL STRIP.AUTO: NEGATIVE
HYALINE CASTS UR QL AUTO: ABNORMAL /LPF
IMM GRANULOCYTES # BLD AUTO: 0.03 10*3/MM3 (ref 0–0.05)
IMM GRANULOCYTES NFR BLD AUTO: 0.3 % (ref 0–0.5)
KETONES UR QL STRIP: ABNORMAL
LEUKOCYTE ESTERASE UR QL STRIP.AUTO: ABNORMAL
LYMPHOCYTES # BLD AUTO: 2.26 10*3/MM3 (ref 0.7–3.1)
LYMPHOCYTES NFR BLD AUTO: 21.9 % (ref 19.6–45.3)
MCH RBC QN AUTO: 32 PG (ref 26.6–33)
MCHC RBC AUTO-ENTMCNC: 34.6 G/DL (ref 31.5–35.7)
MCV RBC AUTO: 92.4 FL (ref 79–97)
MONOCYTES # BLD AUTO: 0.51 10*3/MM3 (ref 0.1–0.9)
MONOCYTES NFR BLD AUTO: 4.9 % (ref 5–12)
NEUTROPHILS NFR BLD AUTO: 7.35 10*3/MM3 (ref 1.7–7)
NEUTROPHILS NFR BLD AUTO: 71.3 % (ref 42.7–76)
NITRITE UR QL STRIP: NEGATIVE
NRBC BLD AUTO-RTO: 0 /100 WBC (ref 0–0.2)
PH UR STRIP.AUTO: 7.5 [PH] (ref 5–8)
PLATELET # BLD AUTO: 253 10*3/MM3 (ref 140–450)
PMV BLD AUTO: 9.6 FL (ref 6–12)
POTASSIUM SERPL-SCNC: 3.3 MMOL/L (ref 3.5–5.2)
PROT ?TM UR-MCNC: 9 MG/DL
PROT SERPL-MCNC: 6.1 G/DL (ref 6–8.5)
PROT UR QL STRIP: NEGATIVE
PROT/CREAT UR: 115.4 MG/G CREA (ref 0–200)
RBC # BLD AUTO: 3.44 10*6/MM3 (ref 3.77–5.28)
RBC # UR STRIP: ABNORMAL /HPF
REF LAB TEST METHOD: ABNORMAL
SODIUM SERPL-SCNC: 136 MMOL/L (ref 136–145)
SP GR UR STRIP: 1.01 (ref 1–1.03)
SQUAMOUS #/AREA URNS HPF: ABNORMAL /HPF
UROBILINOGEN UR QL STRIP: ABNORMAL
WBC # UR STRIP: ABNORMAL /HPF
WBC NRBC COR # BLD AUTO: 10.31 10*3/MM3 (ref 3.4–10.8)

## 2024-12-03 PROCEDURE — 59025 FETAL NON-STRESS TEST: CPT

## 2024-12-03 PROCEDURE — 80053 COMPREHEN METABOLIC PANEL: CPT | Performed by: OBSTETRICS & GYNECOLOGY

## 2024-12-03 PROCEDURE — 85025 COMPLETE CBC W/AUTO DIFF WBC: CPT | Performed by: OBSTETRICS & GYNECOLOGY

## 2024-12-03 PROCEDURE — 25810000003 LACTATED RINGERS SOLUTION: Performed by: OBSTETRICS & GYNECOLOGY

## 2024-12-03 PROCEDURE — 87086 URINE CULTURE/COLONY COUNT: CPT | Performed by: OBSTETRICS & GYNECOLOGY

## 2024-12-03 PROCEDURE — 84156 ASSAY OF PROTEIN URINE: CPT | Performed by: OBSTETRICS & GYNECOLOGY

## 2024-12-03 PROCEDURE — 81001 URINALYSIS AUTO W/SCOPE: CPT | Performed by: OBSTETRICS & GYNECOLOGY

## 2024-12-03 PROCEDURE — 82570 ASSAY OF URINE CREATININE: CPT | Performed by: OBSTETRICS & GYNECOLOGY

## 2024-12-03 PROCEDURE — 99284 EMERGENCY DEPT VISIT MOD MDM: CPT | Performed by: OBSTETRICS & GYNECOLOGY

## 2024-12-03 RX ORDER — ONDANSETRON 4 MG/1
4 TABLET, FILM COATED ORAL EVERY 8 HOURS PRN
Status: ON HOLD | COMMUNITY
End: 2024-12-04

## 2024-12-03 RX ADMIN — SODIUM CHLORIDE, POTASSIUM CHLORIDE, SODIUM LACTATE AND CALCIUM CHLORIDE 1000 ML: 600; 310; 30; 20 INJECTION, SOLUTION INTRAVENOUS at 21:10

## 2024-12-04 ENCOUNTER — HOSPITAL ENCOUNTER (OUTPATIENT)
Facility: HOSPITAL | Age: 24
Discharge: HOME | End: 2024-12-04
Attending: STUDENT IN AN ORGANIZED HEALTH CARE EDUCATION/TRAINING PROGRAM | Admitting: OBSTETRICS & GYNECOLOGY
Payer: COMMERCIAL

## 2024-12-04 VITALS
HEART RATE: 78 BPM | WEIGHT: 230 LBS | OXYGEN SATURATION: 99 % | TEMPERATURE: 98.2 F | SYSTOLIC BLOOD PRESSURE: 126 MMHG | BODY MASS INDEX: 40.75 KG/M2 | DIASTOLIC BLOOD PRESSURE: 75 MMHG | HEIGHT: 63 IN | RESPIRATION RATE: 17 BRPM

## 2024-12-04 PROBLEM — Z34.90 PREGNANCY: Status: ACTIVE | Noted: 2024-12-04

## 2024-12-04 PROBLEM — O13.9 GESTATIONAL HYPERTENSION: Status: ACTIVE | Noted: 2024-12-04

## 2024-12-04 PROCEDURE — 59025 FETAL NON-STRESS TEST: CPT

## 2024-12-04 PROCEDURE — G0463 HOSPITAL OUTPT CLINIC VISIT: HCPCS

## 2024-12-04 PROCEDURE — G0378 HOSPITAL OBSERVATION PER HR: HCPCS

## 2024-12-04 RX ORDER — FAMOTIDINE 20 MG/1
20 TABLET, FILM COATED ORAL
Status: DISCONTINUED | OUTPATIENT
Start: 2024-12-04 | End: 2024-12-04 | Stop reason: HOSPADM

## 2024-12-04 RX ORDER — LABETALOL 100 MG/1
50 TABLET, FILM COATED ORAL ONCE
Status: COMPLETED | OUTPATIENT
Start: 2024-12-04 | End: 2024-12-04

## 2024-12-04 RX ORDER — ONDANSETRON 4 MG/1
TABLET, ORALLY DISINTEGRATING ORAL
COMMUNITY
Start: 2024-11-11

## 2024-12-04 RX ORDER — LABETALOL 100 MG/1
50 TABLET, FILM COATED ORAL 2 TIMES DAILY
Qty: 30 TABLET | Refills: 0 | Status: SHIPPED | OUTPATIENT
Start: 2024-12-04

## 2024-12-04 RX ORDER — CALCIUM CARBONATE 500 MG/1
2 TABLET, CHEWABLE ORAL 3 TIMES DAILY PRN
Status: DISCONTINUED | OUTPATIENT
Start: 2024-12-04 | End: 2024-12-04 | Stop reason: HOSPADM

## 2024-12-04 RX ADMIN — LABETALOL HYDROCHLORIDE 50 MG: 100 TABLET, FILM COATED ORAL at 17:46

## 2024-12-04 RX ADMIN — FAMOTIDINE 20 MG: 20 TABLET, FILM COATED ORAL at 17:25

## 2024-12-04 RX ADMIN — ANTACID TABLETS 2 TABLET: 500 TABLET, CHEWABLE ORAL at 16:09

## 2024-12-04 NOTE — OBED NOTES
SIL[facility] Note OB    Patient Name: Carmita Reyna  YOB: 2000  MRN: [id number]  Admission Date: 12/3/2024  8:15 PM  Date of Service: 12/3/2024    Chief Complaint: Feels weird, blood pressure elevated at home        Subjective     Carmita Reyna is a 24 y.o. female  at Unknown with Estimated Date of Delivery: None noted. who presents with the chief complaint listed above.  Patient reports that she used to have a history of hypotension and syncope.  Because of this she has kept a blood pressure cuff and has checked her blood pressure for years but not this pregnancy.  She reports that her blood pressures in the office have been 100s to 110s over 70s.  Patient reports when she checked her blood pressure with this same cuff she got 145 /95.  She denies headache, pain up under the right quadrant, or unusual swelling.  Patient reports she has had intermittent blurry vision however.     She sees Karuna Cazares MD for her prenatal care. Her pregnancy has been complicated by: Morbid obesity , syncope, anxiety, ADHD    She describes fetal movement as normal.  She denies rupture of membranes.  She denies vaginal bleeding. She is not feeling contractions.        Objective   Patient Active Problem List    Diagnosis     Autonomic dysfunction [G90.9]     Syncope and collapse [R55]     Generalized anxiety disorder with panic attacks [F41.1, F41.0]     Low serum vitamin B12 [E53.8]     Dizziness [R42]     Chronic daily headache [R51.9]     ADHD [F90.9]         OB History    Para Term  AB Living   1 0 0 0 0 0   SAB IAB Ectopic Molar Multiple Live Births   0 0 0 0 0 0      # Outcome Date GA Lbr Anthony/2nd Weight Sex Type Anes PTL Lv   1 Current                 Past Medical History:   Diagnosis Date    ADHD (attention deficit hyperactivity disorder)     Anxiety     Nausea     Syncope        No past surgical history on file.    No current facility-administered medications on file prior to encounter.      Current Outpatient Medications on File Prior to Encounter   Medication Sig Dispense Refill    Prenatal Vit-Fe Fumarate-FA (prenatal vitamin 27-0.8) 27-0.8 MG tablet tablet Take  by mouth Daily.         No Known Allergies    Family History   Problem Relation Age of Onset    Arthritis Mother     Multiple sclerosis Mother     Fibromyalgia Mother     Hyperlipidemia Father     Hypertension Father     Other Father         Pre diabetic       Social History     Socioeconomic History    Marital status: Single   Tobacco Use    Smoking status: Never     Passive exposure: Never    Smokeless tobacco: Never   Vaping Use    Vaping status: Every Day    Substances: Nicotine, Flavoring   Substance and Sexual Activity    Alcohol use: Not Currently     Alcohol/week: 3.0 standard drinks of alcohol     Types: 3 Glasses of wine per week     Comment: social    Drug use: Not Currently     Frequency: 3.0 times per week     Types: Marijuana    Sexual activity: Not Currently     Partners: Male     Birth control/protection: Other, Vaginal insert contraception           Review of Systems   Constitutional:  Negative for chills and fever.   HENT: Negative.     Eyes:  Negative for photophobia and visual disturbance.   Respiratory:  Negative for shortness of breath.    Cardiovascular:  Negative for chest pain.   Gastrointestinal:  Negative for nausea.   Psychiatric/Behavioral:  The patient is not nervous/anxious.           PHYSICAL EXAM:      VITAL SIGNS:  Vitals:    12/03/24 2039 12/03/24 2040 12/03/24 2041 12/03/24 2043   BP: 155/88  149/72 144/77   Pulse: 91 88 87 83   SpO2:  99%     Weight:                FHT'S:   130 with moderate variability and accelerations                                     PHYSICAL EXAM:      General: well developed; well nourished  no acute distress  mentation appropriate   Heart: Not performed.   Lungs   breathing is unlabored   Abdomen: Gravid and non tender     Extremities: trace edema, DTRs 1 plus, no clonus      "  Cervix: was not checked.        Contractions:   none                    LABS AND TESTING ORDERED:  Uterine and fetal monitoring  Urinalysis  CBC, CMP, urine protein creatinine    LAB RESULTS:    No results found for this or any previous visit (from the past 24 hours).    No results found for: \"ABO\", \"RH\"    No results found for: \"STREPGPB\"                    Impression:   @29 weeks.  Final Diagnosis: Elevated blood pressures at home, blood pressures high normal here, labs within normal limits, OB exam benign    Plan:  1.  Review labs, serial blood pressure, 1 L IV fluids  2. Plan of care has been reviewed with patient along with risks, benefits of treatment.   All questions have been answered. Call health care provider for any further concerns and keep office appointments.  3.  Preeclampsia precautions, patient to take her blood pressure monitor in with her to her next OB visit in 2 days,  labor precautions, comfort measures      I discussed the patients findings and my recommendations with patient, family, and nursing staff      Lisa Blakely MD  12/3/2024  20:52 EST    "

## 2024-12-04 NOTE — NON STRESS TEST
Carmita Reyna, a  at 28w5d with an TINA of 2025, by Patient Reported, was seen at [facility] for a nonstress test.    Chief Complaint   Patient presents with    Elevated Blood Pressure     OUTPT- at 28wks arrives from office d/t elevated b/ps and new onset of visual disturbances this AM. Pt reports black spots in vision field this AM x1 occurrence and none since. PT rpeorts +FM and denies LOF, VB, ctxs. Pt denies H/A, N/V, epigastric pain and visual changes at this time. Abdomen palpates soft    Dizziness     PT reports dizziness since yesterday afternoon       Patient Active Problem List   Diagnosis    ADHD    Generalized anxiety disorder with panic attacks    Low serum vitamin B12    Dizziness    Chronic daily headache    Syncope and collapse    Autonomic dysfunction    Pregnancy    Gestational hypertension       Start Time:   Stop Time:     Interpretation A  Nonstress Test Interpretation A: Reactive

## 2024-12-05 LAB — BACTERIA SPEC AEROBE CULT: NO GROWTH

## 2024-12-05 NOTE — DISCHARGE SUMMARY
.Psychiatric  Obstetric History and Physical    Chief Complaint   Patient presents with    Elevated Blood Pressure     OUTPT- at 28wks arrives from office d/t elevated b/ps and new onset of visual disturbances this AM. Pt reports black spots in vision field this AM x1 occurrence and none since. PT rpeorts +FM and denies LOF, VB, ctxs. Pt denies H/A, N/V, epigastric pain and visual changes at this time. Abdomen palpates soft    Dizziness     PT reports dizziness since yesterday afternoon       Subjective     Patient is a 24 y.o. female  currently at 28w5d, who sent from the office today with inc bp again.    Yesterday seen on LD with in bp but not consistent. At home after discharge increased to 140s/90s.     No hx of HTN in the past but  looked up bp from  office visits with other offices and was 138/88 range a couple times.     No si/sx preE.    Labs yesterday on LD all neg including proteinuria         PROBLEM LIST    Pregnancy    Gestational hypertension      Past OB History:       OB History    Para Term  AB Living   1 0 0 0 0 0   SAB IAB Ectopic Molar Multiple Live Births   0 0 0 0 0 0      # Outcome Date GA Lbr Anthony/2nd Weight Sex Type Anes PTL Lv   1 Current                Past Medical History: Past Medical History:   Diagnosis Date    ADHD (attention deficit hyperactivity disorder)     Anxiety     Chlamydia 2019    Gestational hypertension     Nausea     Syncope       Past Surgical History Past Surgical History:   Procedure Laterality Date    TONSILLECTOMY        Family History: Family History   Problem Relation Age of Onset    Arthritis Mother     Multiple sclerosis Mother     Fibromyalgia Mother     Hyperlipidemia Father     Hypertension Father     Other Father         Pre diabetic      Social History:  reports that she has never smoked. She has never been exposed to tobacco smoke. She has never used smokeless tobacco.   reports that she does not currently use alcohol after a  past usage of about 3.0 standard drinks of alcohol per week.   reports that she does not currently use drugs after having used the following drugs: Marijuana.    Allergies:     Patient has no known allergies.       Objective       Vital Signs Range for the last 24 hours  Temperature: Temp:  [98.2 °F (36.8 °C)] 98.2 °F (36.8 °C)   Temp Source: Temp src: Oral   BP: BP: (119-148)/() 126/75   Pulse: Heart Rate:  [71-96] 78   Respirations: Resp:  [17] 17                   Physical Examination:     General :  Alert in NAD  Abdomen: Gravid, nontender        Cervix: Exam by:     Dilation:     Effacement:     Station:         Fetal Heart Rate Assessment   Method: Fetal HR Assessment Method: external   Beats/min: Fetal HR (beats/min): 135   Baseline: Fetal HR Baseline: normal range   Varibility: Fetal HR Variability: moderate (amplitude range 6 to 25 bpm)   Accels: Fetal HR Accelerations: absent   Decels: Fetal HR Decelerations: absent   Tracing Category:       Uterine Assessment   Method: Method: external tocotransducer, palpation   Frequency (min): Contraction Frequency (Minutes): x1   Ctx Count in 10 min:     Duration:     Intensity: Contraction Intensity: no contractions   Intensity by IUPC:     Resting Tone: Uterine Resting Tone: soft by palpation   Resting Tone by IUPC:     [place] Units:         Nst- reactive, cat 1      Assessment & Plan       Assessment:  1.  Intrauterine pregnancy at 28w5d weeks gestation with reassuring fetal status.    2.  Gest HTN with past bp suspicious for CHTN. Counseled pt/ family in detail. Started labetalol 50mg bid. First dose here and tolerated fine.     Plan:   Plan of care has been reviewed with patient and family,.   All questions answered.    Has routine 28wk appt tomorrow in [facility] office and will keep that and bring bp log      Office prenatal reviewed        Lay Montana MD  12/4/2024  22:09 EST

## 2025-01-23 LAB — EXTERNAL GROUP B STREP ANTIGEN: NEGATIVE

## 2025-02-06 ENCOUNTER — HOSPITAL ENCOUNTER (OUTPATIENT)
Dept: LABOR AND DELIVERY | Facility: HOSPITAL | Age: 25
Discharge: HOME | End: 2025-02-06
Payer: COMMERCIAL

## 2025-02-07 ENCOUNTER — HOSPITAL ENCOUNTER (INPATIENT)
Facility: HOSPITAL | Age: 25
LOS: 3 days | Discharge: HOME | End: 2025-02-10
Attending: STUDENT IN AN ORGANIZED HEALTH CARE EDUCATION/TRAINING PROGRAM | Admitting: STUDENT IN AN ORGANIZED HEALTH CARE EDUCATION/TRAINING PROGRAM
Payer: COMMERCIAL

## 2025-02-07 ENCOUNTER — ANESTHESIA (OUTPATIENT)
Dept: LABOR AND DELIVERY | Facility: HOSPITAL | Age: 25
End: 2025-02-07
Payer: COMMERCIAL

## 2025-02-07 ENCOUNTER — ANESTHESIA EVENT (OUTPATIENT)
Dept: LABOR AND DELIVERY | Facility: HOSPITAL | Age: 25
End: 2025-02-07
Payer: COMMERCIAL

## 2025-02-07 LAB
ABO GROUP BLD: NORMAL
ALBUMIN SERPL-MCNC: 3.3 G/DL (ref 3.5–5.2)
ALBUMIN/GLOB SERPL: 1.1 G/DL
ALP SERPL-CCNC: 155 U/L (ref 39–117)
ALT SERPL W P-5'-P-CCNC: 14 U/L (ref 1–33)
ANION GAP SERPL CALCULATED.3IONS-SCNC: 11.4 MMOL/L (ref 5–15)
AST SERPL-CCNC: 11 U/L (ref 1–32)
BASOPHILS # BLD AUTO: 0.02 10*3/MM3 (ref 0–0.2)
BASOPHILS NFR BLD AUTO: 0.2 % (ref 0–1.5)
BILIRUB SERPL-MCNC: <0.2 MG/DL (ref 0–1.2)
BLD GP AB SCN SERPL QL: NEGATIVE
BUN SERPL-MCNC: 10 MG/DL (ref 6–20)
BUN/CREAT SERPL: 13.3 (ref 7–25)
CALCIUM SPEC-SCNC: 9.5 MG/DL (ref 8.6–10.5)
CHLORIDE SERPL-SCNC: 105 MMOL/L (ref 98–107)
CO2 SERPL-SCNC: 21.6 MMOL/L (ref 22–29)
CREAT SERPL-MCNC: 0.75 MG/DL (ref 0.57–1)
DEPRECATED RDW RBC AUTO: 44.8 FL (ref 37–54)
EGFRCR SERPLBLD CKD-EPI 2021: 112.8 ML/MIN/1.73
EOSINOPHIL # BLD AUTO: 0.21 10*3/MM3 (ref 0–0.4)
EOSINOPHIL NFR BLD AUTO: 1.8 % (ref 0.3–6.2)
ERYTHROCYTE [DISTWIDTH] IN BLOOD BY AUTOMATED COUNT: 13.3 % (ref 12.3–15.4)
GLOBULIN UR ELPH-MCNC: 2.9 GM/DL
GLUCOSE SERPL-MCNC: 107 MG/DL (ref 65–99)
HCT VFR BLD AUTO: 31.3 % (ref 34–46.6)
HGB BLD-MCNC: 10.8 G/DL (ref 12–15.9)
IMM GRANULOCYTES # BLD AUTO: 0.07 10*3/MM3 (ref 0–0.05)
IMM GRANULOCYTES NFR BLD AUTO: 0.6 % (ref 0–0.5)
LYMPHOCYTES # BLD AUTO: 2.52 10*3/MM3 (ref 0.7–3.1)
LYMPHOCYTES NFR BLD AUTO: 21.5 % (ref 19.6–45.3)
MCH RBC QN AUTO: 32 PG (ref 26.6–33)
MCHC RBC AUTO-ENTMCNC: 34.5 G/DL (ref 31.5–35.7)
MCV RBC AUTO: 92.9 FL (ref 79–97)
MONOCYTES # BLD AUTO: 0.83 10*3/MM3 (ref 0.1–0.9)
MONOCYTES NFR BLD AUTO: 7.1 % (ref 5–12)
NEUTROPHILS NFR BLD AUTO: 68.8 % (ref 42.7–76)
NEUTROPHILS NFR BLD AUTO: 8.08 10*3/MM3 (ref 1.7–7)
NRBC BLD AUTO-RTO: 0 /100 WBC (ref 0–0.2)
PLATELET # BLD AUTO: 278 10*3/MM3 (ref 140–450)
PMV BLD AUTO: 9.9 FL (ref 6–12)
POTASSIUM SERPL-SCNC: 3.6 MMOL/L (ref 3.5–5.2)
PROT SERPL-MCNC: 6.2 G/DL (ref 6–8.5)
RBC # BLD AUTO: 3.37 10*6/MM3 (ref 3.77–5.28)
RH BLD: POSITIVE
SODIUM SERPL-SCNC: 138 MMOL/L (ref 136–145)
T&S EXPIRATION DATE: NORMAL
TREPONEMA PALLIDUM IGG+IGM AB [PRESENCE] IN SERUM OR PLASMA BY IMMUNOASSAY: NORMAL
WBC NRBC COR # BLD AUTO: 11.73 10*3/MM3 (ref 3.4–10.8)

## 2025-02-07 PROCEDURE — 80053 COMPREHEN METABOLIC PANEL: CPT | Performed by: STUDENT IN AN ORGANIZED HEALTH CARE EDUCATION/TRAINING PROGRAM

## 2025-02-07 PROCEDURE — 25010000002 ONDANSETRON PER 1 MG: Performed by: STUDENT IN AN ORGANIZED HEALTH CARE EDUCATION/TRAINING PROGRAM

## 2025-02-07 PROCEDURE — 25810000003 LACTATED RINGERS PER 1000 ML: Performed by: STUDENT IN AN ORGANIZED HEALTH CARE EDUCATION/TRAINING PROGRAM

## 2025-02-07 PROCEDURE — 86850 RBC ANTIBODY SCREEN: CPT | Performed by: STUDENT IN AN ORGANIZED HEALTH CARE EDUCATION/TRAINING PROGRAM

## 2025-02-07 PROCEDURE — C1755 CATHETER, INTRASPINAL: HCPCS | Performed by: ANESTHESIOLOGY

## 2025-02-07 PROCEDURE — 25010000002 BUTORPHANOL PER 1 MG: Performed by: STUDENT IN AN ORGANIZED HEALTH CARE EDUCATION/TRAINING PROGRAM

## 2025-02-07 PROCEDURE — 10907ZC DRAINAGE OF AMNIOTIC FLUID, THERAPEUTIC FROM PRODUCTS OF CONCEPTION, VIA NATURAL OR ARTIFICIAL OPENING: ICD-10-PCS | Performed by: STUDENT IN AN ORGANIZED HEALTH CARE EDUCATION/TRAINING PROGRAM

## 2025-02-07 PROCEDURE — 86780 TREPONEMA PALLIDUM: CPT | Performed by: STUDENT IN AN ORGANIZED HEALTH CARE EDUCATION/TRAINING PROGRAM

## 2025-02-07 PROCEDURE — 85025 COMPLETE CBC W/AUTO DIFF WBC: CPT | Performed by: STUDENT IN AN ORGANIZED HEALTH CARE EDUCATION/TRAINING PROGRAM

## 2025-02-07 PROCEDURE — 25010000002 LIDOCAINE-EPINEPHRINE (PF) 1 %-1:200000 SOLUTION: Performed by: ANESTHESIOLOGY

## 2025-02-07 PROCEDURE — 25010000002 ONDANSETRON PER 1 MG: Performed by: ANESTHESIOLOGY

## 2025-02-07 PROCEDURE — 3E033VJ INTRODUCTION OF OTHER HORMONE INTO PERIPHERAL VEIN, PERCUTANEOUS APPROACH: ICD-10-PCS | Performed by: STUDENT IN AN ORGANIZED HEALTH CARE EDUCATION/TRAINING PROGRAM

## 2025-02-07 PROCEDURE — 86901 BLOOD TYPING SEROLOGIC RH(D): CPT | Performed by: STUDENT IN AN ORGANIZED HEALTH CARE EDUCATION/TRAINING PROGRAM

## 2025-02-07 PROCEDURE — 86900 BLOOD TYPING SEROLOGIC ABO: CPT | Performed by: STUDENT IN AN ORGANIZED HEALTH CARE EDUCATION/TRAINING PROGRAM

## 2025-02-07 PROCEDURE — 3E0DXGC INTRODUCTION OF OTHER THERAPEUTIC SUBSTANCE INTO MOUTH AND PHARYNX, EXTERNAL APPROACH: ICD-10-PCS | Performed by: STUDENT IN AN ORGANIZED HEALTH CARE EDUCATION/TRAINING PROGRAM

## 2025-02-07 RX ORDER — ONDANSETRON 2 MG/ML
4 INJECTION INTRAMUSCULAR; INTRAVENOUS ONCE AS NEEDED
Status: COMPLETED | OUTPATIENT
Start: 2025-02-07 | End: 2025-02-07

## 2025-02-07 RX ORDER — BUPIVACAINE HYDROCHLORIDE AND EPINEPHRINE 2.5; 5 MG/ML; UG/ML
INJECTION, SOLUTION EPIDURAL; INFILTRATION; INTRACAUDAL; PERINEURAL
Status: ACTIVE
Start: 2025-02-07 | End: 2025-02-07

## 2025-02-07 RX ORDER — LABETALOL 100 MG/1
100 TABLET, FILM COATED ORAL EVERY 12 HOURS SCHEDULED
Status: DISCONTINUED | OUTPATIENT
Start: 2025-02-07 | End: 2025-02-08

## 2025-02-07 RX ORDER — SODIUM CHLORIDE 9 MG/ML
40 INJECTION, SOLUTION INTRAVENOUS AS NEEDED
Status: DISCONTINUED | OUTPATIENT
Start: 2025-02-07 | End: 2025-02-08 | Stop reason: HOSPADM

## 2025-02-07 RX ORDER — ONDANSETRON 2 MG/ML
4 INJECTION INTRAMUSCULAR; INTRAVENOUS EVERY 6 HOURS PRN
Status: DISCONTINUED | OUTPATIENT
Start: 2025-02-07 | End: 2025-02-08 | Stop reason: HOSPADM

## 2025-02-07 RX ORDER — FAMOTIDINE 10 MG/ML
20 INJECTION, SOLUTION INTRAVENOUS ONCE AS NEEDED
Status: COMPLETED | OUTPATIENT
Start: 2025-02-07 | End: 2025-02-08

## 2025-02-07 RX ORDER — TERBUTALINE SULFATE 1 MG/ML
0.25 INJECTION, SOLUTION SUBCUTANEOUS AS NEEDED
Status: DISCONTINUED | OUTPATIENT
Start: 2025-02-07 | End: 2025-02-08 | Stop reason: HOSPADM

## 2025-02-07 RX ORDER — FENTANYL/ROPIVACAINE/NS/PF 2MCG/ML-.2
10 PLASTIC BAG, INJECTION (ML) INJECTION CONTINUOUS
Status: DISCONTINUED | OUTPATIENT
Start: 2025-02-07 | End: 2025-02-08

## 2025-02-07 RX ORDER — SODIUM CHLORIDE 0.9 % (FLUSH) 0.9 %
10 SYRINGE (ML) INJECTION AS NEEDED
Status: DISCONTINUED | OUTPATIENT
Start: 2025-02-07 | End: 2025-02-08 | Stop reason: HOSPADM

## 2025-02-07 RX ORDER — EPHEDRINE SULFATE 50 MG/ML
5 INJECTION, SOLUTION INTRAVENOUS
Status: DISCONTINUED | OUTPATIENT
Start: 2025-02-07 | End: 2025-02-08 | Stop reason: HOSPADM

## 2025-02-07 RX ORDER — BUTORPHANOL TARTRATE 2 MG/ML
2 INJECTION, SOLUTION INTRAMUSCULAR; INTRAVENOUS
Status: DISCONTINUED | OUTPATIENT
Start: 2025-02-07 | End: 2025-02-08 | Stop reason: HOSPADM

## 2025-02-07 RX ORDER — LIDOCAINE HYDROCHLORIDE 10 MG/ML
0.5 INJECTION, SOLUTION INFILTRATION; PERINEURAL ONCE AS NEEDED
Status: DISCONTINUED | OUTPATIENT
Start: 2025-02-07 | End: 2025-02-08 | Stop reason: HOSPADM

## 2025-02-07 RX ORDER — OXYTOCIN/0.9 % SODIUM CHLORIDE 30/500 ML
2-20 PLASTIC BAG, INJECTION (ML) INTRAVENOUS
Status: DISCONTINUED | OUTPATIENT
Start: 2025-02-07 | End: 2025-02-08

## 2025-02-07 RX ORDER — MISOPROSTOL 100 MCG
25 TABLET ORAL EVERY 4 HOURS
Status: COMPLETED | OUTPATIENT
Start: 2025-02-07 | End: 2025-02-07

## 2025-02-07 RX ORDER — FAMOTIDINE 20 MG/1
20 TABLET, FILM COATED ORAL 2 TIMES DAILY PRN
Status: DISCONTINUED | OUTPATIENT
Start: 2025-02-07 | End: 2025-02-08 | Stop reason: HOSPADM

## 2025-02-07 RX ORDER — DIPHENHYDRAMINE HYDROCHLORIDE 50 MG/ML
12.5 INJECTION INTRAMUSCULAR; INTRAVENOUS EVERY 8 HOURS PRN
Status: DISCONTINUED | OUTPATIENT
Start: 2025-02-07 | End: 2025-02-08 | Stop reason: HOSPADM

## 2025-02-07 RX ORDER — MAGNESIUM CARB/ALUMINUM HYDROX 105-160MG
30 TABLET,CHEWABLE ORAL ONCE AS NEEDED
Status: COMPLETED | OUTPATIENT
Start: 2025-02-07 | End: 2025-02-08

## 2025-02-07 RX ORDER — ONDANSETRON 4 MG/1
4 TABLET, ORALLY DISINTEGRATING ORAL EVERY 6 HOURS PRN
Status: DISCONTINUED | OUTPATIENT
Start: 2025-02-07 | End: 2025-02-08 | Stop reason: HOSPADM

## 2025-02-07 RX ORDER — ACETAMINOPHEN 325 MG/1
650 TABLET ORAL EVERY 4 HOURS PRN
Status: DISCONTINUED | OUTPATIENT
Start: 2025-02-07 | End: 2025-02-08 | Stop reason: HOSPADM

## 2025-02-07 RX ORDER — SODIUM CHLORIDE, SODIUM LACTATE, POTASSIUM CHLORIDE, CALCIUM CHLORIDE 600; 310; 30; 20 MG/100ML; MG/100ML; MG/100ML; MG/100ML
125 INJECTION, SOLUTION INTRAVENOUS CONTINUOUS
Status: DISCONTINUED | OUTPATIENT
Start: 2025-02-07 | End: 2025-02-08

## 2025-02-07 RX ORDER — FAMOTIDINE 10 MG/ML
20 INJECTION, SOLUTION INTRAVENOUS 2 TIMES DAILY PRN
Status: DISCONTINUED | OUTPATIENT
Start: 2025-02-07 | End: 2025-02-08 | Stop reason: HOSPADM

## 2025-02-07 RX ADMIN — ONDANSETRON 4 MG: 2 INJECTION, SOLUTION INTRAMUSCULAR; INTRAVENOUS at 15:37

## 2025-02-07 RX ADMIN — BUTORPHANOL TARTRATE 1 MG: 2 INJECTION, SOLUTION INTRAMUSCULAR; INTRAVENOUS at 17:48

## 2025-02-07 RX ADMIN — SODIUM CHLORIDE, POTASSIUM CHLORIDE, SODIUM LACTATE AND CALCIUM CHLORIDE 125 ML/HR: 600; 310; 30; 20 INJECTION, SOLUTION INTRAVENOUS at 15:38

## 2025-02-07 RX ADMIN — Medication 25 MCG: at 11:10

## 2025-02-07 RX ADMIN — Medication 10 ML/HR: at 20:28

## 2025-02-07 RX ADMIN — Medication 2 MILLI-UNITS/MIN: at 15:48

## 2025-02-07 RX ADMIN — LIDOCAINE HYDROCHLORIDE 3 ML: 10; .005 INJECTION, SOLUTION EPIDURAL; INFILTRATION; INTRACAUDAL; PERINEURAL at 20:24

## 2025-02-07 RX ADMIN — LABETALOL HYDROCHLORIDE 100 MG: 100 TABLET, FILM COATED ORAL at 19:28

## 2025-02-07 RX ADMIN — ONDANSETRON 4 MG: 2 INJECTION, SOLUTION INTRAMUSCULAR; INTRAVENOUS at 21:16

## 2025-02-07 RX ADMIN — Medication 25 MCG: at 03:08

## 2025-02-07 RX ADMIN — FAMOTIDINE 20 MG: 10 INJECTION INTRAVENOUS at 19:29

## 2025-02-07 RX ADMIN — Medication 25 MCG: at 07:09

## 2025-02-07 NOTE — PLAN OF CARE
Goal Outcome Evaluation:  Plan of Care Reviewed With: patient, family        Progress: improving  Outcome Evaluation: Patient admitted for induction of labor with cytotec for gestational hypertension. Blood pressure WNL on admission. Plan of care explained to patient and patient's sister. First dose of cytotec placed at 0308 with no issues. Patient currently resting

## 2025-02-07 NOTE — PROGRESS NOTES
Doing well. Was feeling increased pain with contractions and received stadol. Feeling much better now. A little nervous about blood pressures and delivery.    Temp:  [97.5 °F (36.4 °C)-98.3 °F (36.8 °C)] 97.9 °F (36.6 °C)  Heart Rate:  [62-88] 63  Resp:  [16-20] 16  BP: (109-162)/(67-98) 162/88    SCE: 3/70/-2  CEFM: 125/mod/+accel/no decels  [place]: irreg ctxn pattern      A/P:  Induction: s/p miso, on pitocin, AROM at 1835, clear    CHTN: BP increasing particularly when anxious and talking about labor. Will restart oral labetalol. Low suspicion for preeclampsia at this time    Fetal status: Cat I tracing    Karuna Cazares MD  [facility]  02/07/25 19:00 EST

## 2025-02-07 NOTE — H&P
[facility]  Obstetric History and Physical    Patient Name: Carmita Reyna  :  2000  MRN:  [id number]        Chief Complaint   Patient presents with    Scheduled Induction     +FM, - VB or LOF       Subjective     Patient is a 24 y.o. female  currently at 38w0d, who presents for IOL at term secondary to chronic hypertension on medication.           Her prenatal care is significant for  CHTN       Past Medical History: Past Medical History:   Diagnosis Date    ADHD (attention deficit hyperactivity disorder)     Anxiety     Chlamydia 2019    Gestational hypertension     Nausea     Syncope       Past Surgical History Past Surgical History:   Procedure Laterality Date    TONSILLECTOMY        Family History: Family History   Problem Relation Age of Onset    Arthritis Mother     Multiple sclerosis Mother     Fibromyalgia Mother     Hyperlipidemia Father     Hypertension Father     Other Father         Pre diabetic      Social History:  reports that she has never smoked. She has never been exposed to tobacco smoke. She has never used smokeless tobacco.   reports that she does not currently use alcohol after a past usage of about 3.0 standard drinks of alcohol per week.   reports that she does not currently use drugs after having used the following drugs: Marijuana.    Allergies:     Patient has no known allergies.     Past OB History:       OB History    Para Term  AB Living   1 0 0 0 0 0   SAB IAB Ectopic Molar Multiple Live Births   0 0 0 0 0 0      # Outcome Date GA Lbr Anthony/2nd Weight Sex Type Anes PTL Lv   1 Current                  Objective       Vital Signs Range for the last 24 hours  Temperature: Temp:  [97.5 °F (36.4 °C)-98.3 °F (36.8 °C)] 97.5 °F (36.4 °C)   Temp Source: Temp src: Oral   BP: BP: (109-147)/(67-87) 123/70   Pulse: Heart Rate:  [72-83] 83   Respirations: Resp:  [16-20] 16             Physical Exam:        General :    Alert and cooperative in NAD   Lungs:   Clear to  auscultation bilaterally   CV:   Regular rate and rhythm   Abdomen:  Gravid, non-tender, no masses   Vaginal exam: 50/-2     LE:   no edema     FHR:   135/mod/+accel/no decels   [place]:   Irreg ctxns    Lab Results   Component Value Date    WBC 11.73 (H) 2025    HGB 10.8 (L) 2025    HCT 31.3 (L) 2025    MCV 92.9 2025     2025     Lab Results   Component Value Date    GLUCOSE 107 (H) 2025    BUN 10 2025    CREATININE 0.75 2025     2025    K 3.6 2025     2025    CALCIUM 9.5 2025    PROTEINTOT 6.2 2025    ALBUMIN 3.3 (L) 2025    ALT 14 2025    AST 11 2025    ALKPHOS 155 (H) 2025    BILITOT <0.2 2025    GLOB 2.9 2025    AGRATIO 1.1 2025    BCR 13.3 2025    ANIONGAP 11.4 2025    EGFR 112.8 2025      Lab Results   Component Value Date    STREPGPB Negative 2025      A Positive                A/P:  24 y.o.  at 38w0d admitted for IOL at term secondary to CHTN    Induction  Cervical ripening with cytotec  Will then transition to pitocin  Epidural PRN    Fetal status  Reactive and reassuring tracing  CEFM    GBS neg    CHTN  Well controlled on Labetalol 100mg BID  No s/s superimposed preeclampsia at this time  BP mostly normal with some mild range since admission    Dispo: L&D for delivery          Karuna Cazares MD  2025  15:24 EST

## 2025-02-07 NOTE — PLAN OF CARE
Goal Outcome Evaluation:  Plan of Care Reviewed With: patient        Progress: improving  Outcome Evaluation:  at 38w0d IOL for GHTN. 3 doses of cytotec given and pitocin started per protocol at 1548. currently on 6 milliunits. vss. pt given 1 dose of stadol for pain managment. fhr category 1 with irregular ctx. pt currently resting. plans on epidural when ready. anticipate vaginal delivery.

## 2025-02-08 PROBLEM — O10.919 CHRONIC HYPERTENSION AFFECTING PREGNANCY: Status: ACTIVE | Noted: 2025-02-08

## 2025-02-08 PROCEDURE — 0KQM0ZZ REPAIR PERINEUM MUSCLE, OPEN APPROACH: ICD-10-PCS | Performed by: STUDENT IN AN ORGANIZED HEALTH CARE EDUCATION/TRAINING PROGRAM

## 2025-02-08 PROCEDURE — 25810000003 LACTATED RINGERS PER 1000 ML: Performed by: STUDENT IN AN ORGANIZED HEALTH CARE EDUCATION/TRAINING PROGRAM

## 2025-02-08 RX ORDER — OXYTOCIN/0.9 % SODIUM CHLORIDE 30/500 ML
125 PLASTIC BAG, INJECTION (ML) INTRAVENOUS CONTINUOUS PRN
Status: DISCONTINUED | OUTPATIENT
Start: 2025-02-08 | End: 2025-02-10 | Stop reason: HOSPADM

## 2025-02-08 RX ORDER — BISACODYL 10 MG
10 SUPPOSITORY, RECTAL RECTAL DAILY PRN
Status: DISCONTINUED | OUTPATIENT
Start: 2025-02-09 | End: 2025-02-10 | Stop reason: HOSPADM

## 2025-02-08 RX ORDER — OXYTOCIN/0.9 % SODIUM CHLORIDE 30/500 ML
999 PLASTIC BAG, INJECTION (ML) INTRAVENOUS ONCE
Status: COMPLETED | OUTPATIENT
Start: 2025-02-08 | End: 2025-02-08

## 2025-02-08 RX ORDER — METHYLERGONOVINE MALEATE 0.2 MG/ML
200 INJECTION INTRAVENOUS ONCE AS NEEDED
Status: DISCONTINUED | OUTPATIENT
Start: 2025-02-08 | End: 2025-02-10 | Stop reason: HOSPADM

## 2025-02-08 RX ORDER — OXYTOCIN/0.9 % SODIUM CHLORIDE 30/500 ML
250 PLASTIC BAG, INJECTION (ML) INTRAVENOUS CONTINUOUS
Status: ACTIVE | OUTPATIENT
Start: 2025-02-08 | End: 2025-02-08

## 2025-02-08 RX ORDER — DOCUSATE SODIUM 100 MG/1
100 CAPSULE, LIQUID FILLED ORAL 2 TIMES DAILY
Status: DISCONTINUED | OUTPATIENT
Start: 2025-02-08 | End: 2025-02-10 | Stop reason: HOSPADM

## 2025-02-08 RX ORDER — MISOPROSTOL 200 UG/1
600 TABLET ORAL ONCE AS NEEDED
Status: DISCONTINUED | OUTPATIENT
Start: 2025-02-08 | End: 2025-02-10 | Stop reason: HOSPADM

## 2025-02-08 RX ORDER — PROMETHAZINE HYDROCHLORIDE 12.5 MG/1
12.5 SUPPOSITORY RECTAL EVERY 6 HOURS PRN
Status: DISCONTINUED | OUTPATIENT
Start: 2025-02-08 | End: 2025-02-10 | Stop reason: HOSPADM

## 2025-02-08 RX ORDER — CALCIUM CARBONATE 500 MG/1
2 TABLET, CHEWABLE ORAL 3 TIMES DAILY PRN
Status: DISCONTINUED | OUTPATIENT
Start: 2025-02-08 | End: 2025-02-10 | Stop reason: HOSPADM

## 2025-02-08 RX ORDER — LABETALOL 100 MG/1
100 TABLET, FILM COATED ORAL 2 TIMES DAILY
Status: DISCONTINUED | OUTPATIENT
Start: 2025-02-08 | End: 2025-02-10 | Stop reason: HOSPADM

## 2025-02-08 RX ORDER — ONDANSETRON 4 MG/1
4 TABLET, ORALLY DISINTEGRATING ORAL EVERY 8 HOURS PRN
Status: DISCONTINUED | OUTPATIENT
Start: 2025-02-08 | End: 2025-02-10 | Stop reason: HOSPADM

## 2025-02-08 RX ORDER — ONDANSETRON 2 MG/ML
4 INJECTION INTRAMUSCULAR; INTRAVENOUS EVERY 6 HOURS PRN
Status: DISCONTINUED | OUTPATIENT
Start: 2025-02-08 | End: 2025-02-10 | Stop reason: HOSPADM

## 2025-02-08 RX ORDER — TRANEXAMIC ACID 10 MG/ML
1000 INJECTION, SOLUTION INTRAVENOUS ONCE AS NEEDED
Status: DISCONTINUED | OUTPATIENT
Start: 2025-02-08 | End: 2025-02-10 | Stop reason: HOSPADM

## 2025-02-08 RX ORDER — HYDROCORTISONE 25 MG/G
CREAM TOPICAL AS NEEDED
Status: DISCONTINUED | OUTPATIENT
Start: 2025-02-08 | End: 2025-02-10 | Stop reason: HOSPADM

## 2025-02-08 RX ORDER — MISOPROSTOL 200 UG/1
800 TABLET ORAL ONCE AS NEEDED
Status: DISCONTINUED | OUTPATIENT
Start: 2025-02-08 | End: 2025-02-08 | Stop reason: HOSPADM

## 2025-02-08 RX ORDER — HYDROXYZINE HYDROCHLORIDE 50 MG/1
50 TABLET, FILM COATED ORAL NIGHTLY PRN
Status: DISCONTINUED | OUTPATIENT
Start: 2025-02-08 | End: 2025-02-10 | Stop reason: HOSPADM

## 2025-02-08 RX ORDER — IBUPROFEN 600 MG/1
600 TABLET, FILM COATED ORAL EVERY 6 HOURS PRN
Status: DISCONTINUED | OUTPATIENT
Start: 2025-02-08 | End: 2025-02-10 | Stop reason: HOSPADM

## 2025-02-08 RX ORDER — ACETAMINOPHEN 325 MG/1
650 TABLET ORAL EVERY 6 HOURS PRN
Status: DISCONTINUED | OUTPATIENT
Start: 2025-02-08 | End: 2025-02-10 | Stop reason: HOSPADM

## 2025-02-08 RX ORDER — TRANEXAMIC ACID 10 MG/ML
1000 INJECTION, SOLUTION INTRAVENOUS ONCE AS NEEDED
Status: DISCONTINUED | OUTPATIENT
Start: 2025-02-08 | End: 2025-02-08

## 2025-02-08 RX ORDER — CARBOPROST TROMETHAMINE 250 UG/ML
250 INJECTION, SOLUTION INTRAMUSCULAR
Status: DISCONTINUED | OUTPATIENT
Start: 2025-02-08 | End: 2025-02-08 | Stop reason: HOSPADM

## 2025-02-08 RX ORDER — TRAMADOL HYDROCHLORIDE 50 MG/1
50 TABLET ORAL EVERY 6 HOURS PRN
Status: DISCONTINUED | OUTPATIENT
Start: 2025-02-08 | End: 2025-02-09

## 2025-02-08 RX ORDER — KETOROLAC TROMETHAMINE 15 MG/ML
15 INJECTION, SOLUTION INTRAMUSCULAR; INTRAVENOUS EVERY 6 HOURS PRN
Status: DISCONTINUED | OUTPATIENT
Start: 2025-02-08 | End: 2025-02-10 | Stop reason: HOSPADM

## 2025-02-08 RX ORDER — METHYLERGONOVINE MALEATE 0.2 MG/ML
200 INJECTION INTRAVENOUS ONCE AS NEEDED
Status: DISCONTINUED | OUTPATIENT
Start: 2025-02-08 | End: 2025-02-08 | Stop reason: HOSPADM

## 2025-02-08 RX ORDER — PROMETHAZINE HYDROCHLORIDE 25 MG/1
25 TABLET ORAL EVERY 6 HOURS PRN
Status: DISCONTINUED | OUTPATIENT
Start: 2025-02-08 | End: 2025-02-10 | Stop reason: HOSPADM

## 2025-02-08 RX ADMIN — FAMOTIDINE 20 MG: 10 INJECTION INTRAVENOUS at 04:14

## 2025-02-08 RX ADMIN — Medication 999 ML/HR: at 02:24

## 2025-02-08 RX ADMIN — Medication 125 ML/HR: at 04:09

## 2025-02-08 RX ADMIN — SODIUM CHLORIDE, POTASSIUM CHLORIDE, SODIUM LACTATE AND CALCIUM CHLORIDE 125 ML/HR: 600; 310; 30; 20 INJECTION, SOLUTION INTRAVENOUS at 00:18

## 2025-02-08 RX ADMIN — TRAMADOL HYDROCHLORIDE 50 MG: 50 TABLET ORAL at 21:55

## 2025-02-08 RX ADMIN — IBUPROFEN 600 MG: 600 TABLET, FILM COATED ORAL at 09:37

## 2025-02-08 RX ADMIN — MINERAL OIL 30 ML: 999 LIQUID ORAL at 02:00

## 2025-02-08 RX ADMIN — DOCUSATE SODIUM 100 MG: 100 CAPSULE, LIQUID FILLED ORAL at 21:55

## 2025-02-08 RX ADMIN — LABETALOL HYDROCHLORIDE 100 MG: 100 TABLET, FILM COATED ORAL at 21:55

## 2025-02-08 RX ADMIN — Medication 10 ML/HR: at 01:41

## 2025-02-08 RX ADMIN — IBUPROFEN 600 MG: 600 TABLET, FILM COATED ORAL at 15:41

## 2025-02-08 RX ADMIN — TRAMADOL HYDROCHLORIDE 50 MG: 50 TABLET ORAL at 13:48

## 2025-02-08 RX ADMIN — LABETALOL HYDROCHLORIDE 100 MG: 100 TABLET, FILM COATED ORAL at 09:37

## 2025-02-08 RX ADMIN — DOCUSATE SODIUM 100 MG: 100 CAPSULE, LIQUID FILLED ORAL at 09:37

## 2025-02-08 NOTE — ANESTHESIA PROCEDURE NOTES
Labor Epidural      Patient reassessed immediately prior to procedure    Patient location during procedure: OB  Start Time: 2/7/2025 8:11 PM  Stop Time: 2/7/2025 8:24 PM  Indication:at surgeon's request  Performed By  Anesthesiologist: Ricardo Zavaleta MD  Preanesthetic Checklist  Completed: patient identified, IV checked, site marked, risks and benefits discussed, surgical consent, monitors and equipment checked, pre-op evaluation and timeout performed  Prep:  Pt Position:sitting  Sterile Tech:cap, mask, sterile barrier and gloves  Prep:chlorhexidine gluconate and isopropyl alcohol  Monitoring:blood pressure monitoring, continuous pulse oximetry and EKG  Epidural Block Procedure:  Approach:midline  Guidance:landmark technique and palpation technique  Location:L3-L4  Needle Type:Tuohy  Needle Gauge:17 G  Loss of Resistance Medium: saline  Loss of Resistance: 9cm  Cath Depth at skin:14 cm  Paresthesia: none  Aspiration:negative  Test Dose:negative  Number of Attempts: 1  Post Assessment:  Dressing:secured with tape and occlusive dressing applied  Pt Tolerance:patient tolerated the procedure well with no apparent complications  Complications:no

## 2025-02-08 NOTE — ANESTHESIA POSTPROCEDURE EVALUATION
Patient: Carmita Reyna    Procedure Summary       Date: 02/07/25 Room / Location:     Anesthesia Start: 2011 Anesthesia Stop: 02/08/25 0220    Procedure: LABOR ANALGESIA Diagnosis:     Scheduled Providers:  Provider: Ricardo Zavaleta MD    Anesthesia Type: epidural ASA Status: 3            Anesthesia Type: epidural    Vitals  Vitals Value Taken Time   /79 02/08/25 0431   Temp 36.8 °C (98.2 °F) 02/08/25 0246   Pulse 78 02/08/25 0431   Resp 18 02/08/25 0431   SpO2 100 % 02/08/25 0246           Post Anesthesia Care and Evaluation    Anesthetic complications: No anesthetic complications

## 2025-02-08 NOTE — LACTATION NOTE
This note was copied from a baby's chart.  P1 term baby, 8hrs old. Checked on Mom several times. She has been sleeping. Baby is taking formula.

## 2025-02-08 NOTE — L&D DELIVERY NOTE
[facility]  Vaginal Delivery Note    Patient Name: Carmita Reyna  :  2000  MRN:  [id number]      Diagnoses:  IUP at 38w1d  CHTN    Delivery     Delivery: Vaginal, Spontaneous    YOB: 2027   Time of Birth: 2:20 AM     Anesthesia: Epidural    Delivering clinician: Karuna Cazares MD       Infant    Findings: Viable male infant    Infant observations: Weight: 3210 g (7 lb 1.2 oz)  Observations/Comments:  scale 4     Apgars: 8  @ 1 minute /    9  @ 5 minutes     Placenta, Cord, and Fluid    Placenta delivered  Spontaneous  at  2025  2:25 AM    Cord: 3 vessels present.   Cord blood obtained: Yes   Cord gases obtained:  No     Repair    Episiotomy: No   Lacerations: 2nd degree perineal         Estimated Blood Loss:  Quantitative Blood Loss:    mls.  Quantitative Blood Loss (mL): 171 mL         Delivery narrative: The patient is a 24 y.o.  at 38w1d who presented for scheduled IOL secondary to CHTN. Progressed to C/C/+1 @ 0131. Labored down until 0200. Fetal status reassuring throughout. Pushed with good maternal effort for  of viable male infant  @ 2:20 AM. Head delivered in GABRIELLA position. Gentle downward traction on the fetal head resulted in spontaneous and atraumatic delivery of the right anterior shoulder followed by left posterior shoulder and body. Infant placed to maternal abdomen where cord was clamped and cut after 60 second delay. 3210 g (7 lb 1.2 oz). Placenta delivered spontaneously, intact with 3 vessel cord. Vagina, perineum and cervix examined. Cervix and rectum intact. 2nd degree laceration repaired in usual fashion with 3-0 vicryl suture. Sponge and instrument count correct. Mother and baby recovering good condition.       Karuna Cazares MD  25  07:17 EST

## 2025-02-08 NOTE — LACTATION NOTE
This note was copied from a baby's chart.  Mom reports sleepy baby and she wants to start pumping. Helped her pump with her Mom Cozy and 1.5mls EBM given to baby. She is also supplementing with formula.  Discussed milk production, encouraged pumping every 3hrs giving baby all EBM if he continues to be sleepy with breast feeding and discussed how to know if baby is getting enough milk with breast feeding. Mom will call for further assistance.

## 2025-02-09 LAB
BASOPHILS # BLD AUTO: 0.04 10*3/MM3 (ref 0–0.2)
BASOPHILS NFR BLD AUTO: 0.3 % (ref 0–1.5)
DEPRECATED RDW RBC AUTO: 44.1 FL (ref 37–54)
EOSINOPHIL # BLD AUTO: 0.27 10*3/MM3 (ref 0–0.4)
EOSINOPHIL NFR BLD AUTO: 2.1 % (ref 0.3–6.2)
ERYTHROCYTE [DISTWIDTH] IN BLOOD BY AUTOMATED COUNT: 13.2 % (ref 12.3–15.4)
HCT VFR BLD AUTO: 28.5 % (ref 34–46.6)
HGB BLD-MCNC: 9.8 G/DL (ref 12–15.9)
IMM GRANULOCYTES # BLD AUTO: 0.05 10*3/MM3 (ref 0–0.05)
IMM GRANULOCYTES NFR BLD AUTO: 0.4 % (ref 0–0.5)
LYMPHOCYTES # BLD AUTO: 2.64 10*3/MM3 (ref 0.7–3.1)
LYMPHOCYTES NFR BLD AUTO: 20.8 % (ref 19.6–45.3)
MCH RBC QN AUTO: 31.9 PG (ref 26.6–33)
MCHC RBC AUTO-ENTMCNC: 34.4 G/DL (ref 31.5–35.7)
MCV RBC AUTO: 92.8 FL (ref 79–97)
MONOCYTES # BLD AUTO: 0.79 10*3/MM3 (ref 0.1–0.9)
MONOCYTES NFR BLD AUTO: 6.2 % (ref 5–12)
NEUTROPHILS NFR BLD AUTO: 70.2 % (ref 42.7–76)
NEUTROPHILS NFR BLD AUTO: 8.88 10*3/MM3 (ref 1.7–7)
NRBC BLD AUTO-RTO: 0 /100 WBC (ref 0–0.2)
PLATELET # BLD AUTO: 240 10*3/MM3 (ref 140–450)
PMV BLD AUTO: 9.8 FL (ref 6–12)
RBC # BLD AUTO: 3.07 10*6/MM3 (ref 3.77–5.28)
WBC NRBC COR # BLD AUTO: 12.67 10*3/MM3 (ref 3.4–10.8)

## 2025-02-09 PROCEDURE — 85025 COMPLETE CBC W/AUTO DIFF WBC: CPT | Performed by: STUDENT IN AN ORGANIZED HEALTH CARE EDUCATION/TRAINING PROGRAM

## 2025-02-09 RX ORDER — TRAMADOL HYDROCHLORIDE 50 MG/1
100 TABLET ORAL EVERY 6 HOURS PRN
Status: DISCONTINUED | OUTPATIENT
Start: 2025-02-09 | End: 2025-02-10 | Stop reason: HOSPADM

## 2025-02-09 RX ORDER — TRAMADOL HYDROCHLORIDE 50 MG/1
50 TABLET ORAL EVERY 6 HOURS PRN
Status: DISCONTINUED | OUTPATIENT
Start: 2025-02-09 | End: 2025-02-10 | Stop reason: HOSPADM

## 2025-02-09 RX ADMIN — LABETALOL HYDROCHLORIDE 100 MG: 100 TABLET, FILM COATED ORAL at 21:15

## 2025-02-09 RX ADMIN — TRAMADOL HYDROCHLORIDE 100 MG: 50 TABLET ORAL at 11:35

## 2025-02-09 RX ADMIN — LABETALOL HYDROCHLORIDE 100 MG: 100 TABLET, FILM COATED ORAL at 09:12

## 2025-02-09 RX ADMIN — DOCUSATE SODIUM 100 MG: 100 CAPSULE, LIQUID FILLED ORAL at 21:15

## 2025-02-09 RX ADMIN — TRAMADOL HYDROCHLORIDE 50 MG: 50 TABLET ORAL at 05:48

## 2025-02-09 RX ADMIN — DOCUSATE SODIUM 100 MG: 100 CAPSULE, LIQUID FILLED ORAL at 09:12

## 2025-02-09 RX ADMIN — TRAMADOL HYDROCHLORIDE 100 MG: 50 TABLET ORAL at 20:25

## 2025-02-09 RX ADMIN — IBUPROFEN 600 MG: 600 TABLET, FILM COATED ORAL at 09:12

## 2025-02-09 NOTE — PLAN OF CARE
Goal Outcome Evaluation:      VS stable. Fundus firm. No excessive lochia. + bonding with . Increased perineum pain this am. Tramadol increased to 100mg per dr Cazares. Cold packs and waffle cushion given. Pt states pain controlled at this time. Plan dc in am.

## 2025-02-09 NOTE — LACTATION NOTE
Pt attempting to latch baby. LC assisted pt and baby is not wanting to latch at this time. Educated on importance of deep latching and ways to achieve it, supply and demand. Pt is pumping every feeding and getting up to 4 ml of colostrum. Encouraged to call LC as needed. Baby is getting formula supplement    Lactation Consult Note    Evaluation Completed: 2025 10:44 EST  Patient Name: Carmita Reyna  :  2000  MRN:  [id number]     REFERRAL  INFORMATION:                                         DELIVERY HISTORY:        Skin to skin initiation date/time: 2025 2:21 AM  Skin to skin end date/time: 2025 3:21 AM       MATERNAL ASSESSMENT:                               INFANT ASSESSMENT:  Information for the patient's :  Kar Reyna [[id number]]   No past medical history on file.                                                                                                  MATERNAL INFANT FEEDING:                                                                       EQUIPMENT TYPE:                                 BREAST PUMPING:          LACTATION REFERRALS:

## 2025-02-10 VITALS
OXYGEN SATURATION: 100 % | HEART RATE: 69 BPM | DIASTOLIC BLOOD PRESSURE: 84 MMHG | WEIGHT: 240.6 LBS | RESPIRATION RATE: 16 BRPM | TEMPERATURE: 97.9 F | BODY MASS INDEX: 42.62 KG/M2 | SYSTOLIC BLOOD PRESSURE: 122 MMHG

## 2025-02-10 RX ORDER — ACETAMINOPHEN 325 MG/1
650 TABLET ORAL EVERY 6 HOURS PRN
Qty: 60 TABLET | Refills: 0 | Status: SHIPPED | OUTPATIENT
Start: 2025-02-10

## 2025-02-10 RX ORDER — PSEUDOEPHEDRINE HCL 30 MG
100 TABLET ORAL 2 TIMES DAILY
Qty: 60 CAPSULE | Refills: 0 | Status: SHIPPED | OUTPATIENT
Start: 2025-02-10

## 2025-02-10 RX ORDER — FERROUS SULFATE 325(65) MG
325 TABLET ORAL EVERY OTHER DAY
Qty: 30 TABLET | Refills: 0 | Status: SHIPPED | OUTPATIENT
Start: 2025-02-10

## 2025-02-10 RX ORDER — IBUPROFEN 600 MG/1
600 TABLET, FILM COATED ORAL EVERY 6 HOURS PRN
Qty: 60 TABLET | Refills: 0 | Status: SHIPPED | OUTPATIENT
Start: 2025-02-10

## 2025-02-10 RX ORDER — LABETALOL 100 MG/1
100 TABLET, FILM COATED ORAL 2 TIMES DAILY
Qty: 60 TABLET | Refills: 1 | Status: SHIPPED | OUTPATIENT
Start: 2025-02-10

## 2025-02-10 RX ADMIN — DOCUSATE SODIUM 100 MG: 100 CAPSULE, LIQUID FILLED ORAL at 09:28

## 2025-02-10 RX ADMIN — TRAMADOL HYDROCHLORIDE 50 MG: 50 TABLET ORAL at 06:18

## 2025-02-10 RX ADMIN — LABETALOL HYDROCHLORIDE 100 MG: 100 TABLET, FILM COATED ORAL at 09:28

## 2025-02-10 RX ADMIN — TRAMADOL HYDROCHLORIDE 50 MG: 50 TABLET ORAL at 13:05

## 2025-02-10 NOTE — LACTATION NOTE
This note was copied from a baby's chart.  Rounded on mom at this time to give her d/c instructions, but the Pediatrician is there. Mom said she has few questions and will call with next feeding. LC phone number placed on the white board.

## 2025-02-10 NOTE — DISCHARGE SUMMARY
Date of Discharge:  2/10/2025    Discharge Diagnosis: Pregnancy s/p vaginal delivery      Brief HPI and Hospital Course  Patient is a 24 y.o. female presented to L&D for induction of labor at 38w0d due to Chronic hypertension on medication. Pregnancy was otherwise uncomplicated. Labor was induced with cytotec followed by pitocin and AROM. Uncomplicated labor course resulting in  with 2nd degree laceration and QBL of 171. Patient to be discharged on PP Day 2. Upon discharge, patient was tolerating regular diet, pain well-controlled on PO meds, ambulating, and voiding spontaneously. Denies headache/vision changes/RUQ pain. The patient feels well without complaints. She was discharged home with standard discharge precautions and instructions.        Procedures Performed     Vaginal Delivery      Vital Signs  Temp:  [97.8 °F (36.6 °C)-97.9 °F (36.6 °C)] 97.9 °F (36.6 °C)  Heart Rate:  [69-86] 69  Resp:  [16] 16  BP: (122-138)/(82-87) 122/84      Physical Exam:   General: Awake and alert, well-appearing, no acute distress   CV: Well perfused   Lungs: No increased work of breathing or signs of respiratory distress.   Abdomen: Fundus: firm, non tender    Extremities:  LE +1 edema, Calves NT bilaterally    Psych: Mood and affect appropriate          Discharge Disposition  Home or Self Care        Discharge Medications     Discharge Medications        New Medications        Instructions Start Date   acetaminophen 325 MG tablet  Commonly known as: TYLENOL   650 mg, Oral, Every 6 Hours PRN      docusate sodium 100 MG capsule   100 mg, Oral, 2 Times Daily      ibuprofen 600 MG tablet  Commonly known as: ADVIL,MOTRIN   600 mg, Oral, Every 6 Hours PRN             Continue These Medications        Instructions Start Date   labetalol 100 MG tablet  Commonly known as: NORMODYNE   100 mg, Oral, 2 Times Daily             ASK your doctor about these medications        Instructions Start Date   ondansetron ODT 4 MG disintegrating  tablet  Commonly known as: ZOFRAN-ODT       prenatal vitamin 27-0.8 27-0.8 MG tablet tablet   Daily               Assessment & Plan   PPD2  S/P   Stable for discharge. Instructions and precautions reviewed  2. Activity at Discharge  A. restrictions reviewed  3. Follow-up Appointments  A. BP check Thurs/Fri, 2wk TH and 6wk in-office visit  4. CHTN   A. Stable BP on labetalol 100mg BID, denies s&s pree  5. Male infant   A. well at bedside, circ deferred due to torsion        Genevieve Dumont, SHANEKA  02/10/25  10:47 EST

## 2025-02-10 NOTE — PROGRESS NOTES
"Discharge Planning Assessment  [facility]     Patient Name: Carmita Reyna  MRN: [id number]  Today's Date: 2/10/2025    Admit Date: 2/7/2025    Plan: Infant may discharge to mother when medically ready; CSW will follow cord tox. LARY Mendieta.   Discharge Needs Assessment    No documentation.                  Discharge Plan       Row Name 02/10/25 1453       Plan    Plan Infant may discharge to mother when medically ready; CSW will follow cord tox. LARY Mendieta.    Plan Comments Mother: Carmita Reyna, MRN: [id number]; infant: Kar \"Moffat\" Maribell, MRN: [id number]. CSW consulted for \"Mother has a hx of THC use.  Per mother, quit when she took her pregnancy test.\" Of note, mother's UDS was not collected on admit. Infant's UDS was missed; cord toxicology sent. CSW spoke to mother's RN, who reports mother has no psychosocial needs or concerns at this time. CSW made the decision that mother did not require a needs-based assessment at this time. CSW will follow cord toxicology, and complete mandated reporting to CPS if warranted. LARY Mendieta.                  Continued Care and Services - Admitted Since 2/7/2025    No active coordination exists for this encounter.       Expected Discharge Date and Time       Expected Discharge Date Expected Discharge Time    Feb 10, 2025            Demographic Summary       Row Name 02/10/25 1453       General Information    Admission Type inpatient    Arrived From home    Referral Source nursing    Reason for Consult substance use concerns    General Information Comments Mother has a hx of THC use.  Per mother, quit when she took her pregnancy test                   Functional Status    No documentation.                  Psychosocial    No documentation.                  Abuse/Neglect    No documentation.                  Legal    No documentation.                  Substance Abuse    No documentation.                  Patient Forms    No documentation.                     Meghan " KASSANDRA Navarro

## 2025-02-10 NOTE — DISCHARGE INSTRUCTIONS
Discharge Instructions:  - Resume your normal diet with a hydration goal of 100oz daily  - Move around as much as you are comfortable, with an emphasis on rest, recovery, and bonding with your baby  - Light activity such as daily walk. Gradually increase activity as tolerated  - No lifting more than 15lbs for 2 weeks  - Showers and baths in your own *clean* tub are okay; no pools or hot tubs  - You are encouraged to do sitz baths 2x/day until your perineum/bottom feels well healed  - Nothing per vagina (no sex, tampons, douching) for 6 weeks.   - No driving until you are no longer taking narcotics    Call if you experience any of the following:  - Temperature greater than 100.4F degrees  - Vaginal bleeding greater than 1 pad/hour, or clots larger than an egg  - Breasts that are painful, red, or have red-streaking; especially if accompanied by a fever or flu-like symptoms  - An incision or laceration that is not healing or becoming more painful  - A red or swollen leg that is painful or warm to the touch  - Intense lower abdomen pain that is different than normal postpartum cramping  - Vaginal discharge that has a foul odor  - Headache that does not get better with tylenol or ibuprofen  - Changes to your vision  - Difficulty breathing  - Difficulty with depression, anxiety, or mood swings    **Call 911 if you have chest pain, difficulty breathing, or thoughts of hurting yourself or someone else

## 2025-02-10 NOTE — PLAN OF CARE
Goal Outcome Evaluation:              Outcome Evaluation: VSS. Lochia and fundus WNL. Up ad little. Breast and bottle feeding and pumping well. Bonding well with baby. D/C for today.

## 2025-02-11 ENCOUNTER — MATERNAL SCREENING (OUTPATIENT)
Dept: CALL CENTER | Facility: HOSPITAL | Age: 25
End: 2025-02-11
Payer: COMMERCIAL

## 2025-02-11 NOTE — OUTREACH NOTE
Maternal Screening Survey      Flowsheet Row Responses   Eligibility Eligible   Prep survey completed? Yes   Facility patient discharged from? [facility] - Registered [facility]

## 2025-02-13 NOTE — PROGRESS NOTES
"Continued Stay Note  [facility]     Patient Name: Carmita Reyna  MRN: [id number]  Today's Date: 2/13/2025    Admit Date: 2/7/2025    Plan: Infant may discharge to mother when medically ready; CSW will follow cord tox. LARY Mendieta.   Discharge Plan       Row Name 02/13/25 1117       Plan    Plan Comments Mother: Carmita Reyna, MRN: [id number]; infant: Kar \"Ruskin\" Maribell, MRN: [id number]. CSW has reviewed infant's cord toxicology results and infant's cords was negative for any substances. Mandated CPS reporting is not required at this time. LARY Mendieta.                   Discharge Codes    No documentation.                 Expected Discharge Date and Time       Expected Discharge Date Expected Discharge Time    Feb 10, 2025               KASSANDRA Hernandez    "

## 2025-02-19 ENCOUNTER — MATERNAL SCREENING (OUTPATIENT)
Dept: CALL CENTER | Facility: HOSPITAL | Age: 25
End: 2025-02-19
Payer: COMMERCIAL

## 2025-03-25 NOTE — PROGRESS NOTES
"Chief Complaint  Dizziness (C/o feeling lightheaded this morning, states she almost blacked out, c/o feeling fatigue)    Subjective          Carmita Reyna presents to [facility]  History of Present Illness patient returns today for another syncopal episode.  She felt very lightheaded while just sitting at work.  She was not exerting herself.  She was able to keep herself from LOC but this seems more extreme than previous episodes.  She has some dizziness surrounding the syncopal feeling which now is making her nauseous.  She also reports that her head and room feel like they are spinning.  She felt her heart racing at onset.  She laid down at work and fan herself and felt better.  She still is reporting food is making her nauseous and she has a lack of appetite but is staying hydrated.  She feels weak making walking even difficult.  She has weaned off her Lamictal without any problems thinking it might be because of the the symptoms.  This time she was not evaluated anywhere.    She has no current fever or chills.  No changes in vision.    Objective   Vital Signs:   Temp 97.5 °F (36.4 °C)   Ht 160 cm (63\")   SpO2 97%   BMI 39.93 kg/m²     BMI is above normal parameters. Recommendations: educational material discussed/shared in after visit summary       Physical Exam  Vitals and nursing note reviewed.   Constitutional:       General: She is not in acute distress.     Appearance: She is well-developed. She is not ill-appearing or diaphoretic.   HENT:      Head: Normocephalic and atraumatic.   Eyes:      General: No scleral icterus.        Right eye: No discharge.         Left eye: No discharge.      Extraocular Movements: Extraocular movements intact.      Conjunctiva/sclera: Conjunctivae normal.      Pupils: Pupils are equal, round, and reactive to light.   Cardiovascular:      Rate and Rhythm: Regular rhythm. Tachycardia present.      Heart sounds: Normal heart sounds.   Pulmonary:    "   Effort: Pulmonary effort is normal.      Breath sounds: Normal breath sounds.   Abdominal:      General: Bowel sounds are normal.      Palpations: Abdomen is soft.      Tenderness: There is no abdominal tenderness.   Musculoskeletal:         General: No deformity.      Cervical back: Neck supple.      Comments: Gait smooth and steady   Lymphadenopathy:      Cervical: No cervical adenopathy.   Skin:     General: Skin is warm and dry.   Neurological:      General: No focal deficit present.      Mental Status: She is alert and oriented to person, place, and time.   Psychiatric:         Attention and Perception: Attention and perception normal.         Mood and Affect: Affect normal. Mood is anxious.         Speech: Speech normal.         Behavior: Behavior normal. Behavior is cooperative.         Thought Content: Thought content normal.         Cognition and Memory: Cognition normal.        Result Review :            ECG 12 Lead    Date/Time: 4/4/2022 3:32 PM  Performed by: Britt Hyatt APRN  Authorized by: Britt Hyatt APRN   Comparison: not compared with previous ECG   Previous ECG: no previous ECG available  Rhythm: sinus tachycardia  Rate: tachycardic  Conduction: conduction normal  ST Segments: ST segments normal  T Waves: T waves normal  QRS axis: normal  Other: no other findings    Clinical impression: abnormal EKG              Assessment and Plan    Diagnoses and all orders for this visit:    1. Syncope, unspecified syncope type (Primary)  -     POCT pregnancy, urine  -     ECG 12 Lead    2. Tachycardia    pregnancy test is negative.  She had previous work-up for similar episode recently in [place] and followed up with me.  Today she is tachycardic.  She has had prior eval for tachycardia in 2020.  Reviewed Holter monitor study which showed tachycardia, no pauses, rare supraventricular ectopy which was brief.  She had no A. fib or block noted.  She had previously been on Florinef.    Patient would like  to keep providers within [facility] system and is requesting cardiologist at [facility] for reevaluation.  She has had these episodes since middle school and seem to be more frequent.  She is concerned about possible POTS. She is aware of s/s that require emergent eval.     Referred to cardiology.  Discussed signs and symptoms that require emergent evaluation.    Follow Up   No follow-ups on file.  Patient was given instructions and counseling regarding her condition or for health maintenance advice. Please see specific information pulled into the AVS if appropriate.        FOLLOW UP WITH YOUR PEDIATRICIAN  IN 3 DAYS FOR REEVALUATION.      RETURN TO ED IMMEDIATELY WITH ANY WORSENING SYMPTOMS, PERSISTENT VOMITING OR DIARRHEA, DECREASED URINATION/ WET DIAPERS OR TEARS, CHANGE IN BEHAVIOR, WEAKNESS OR LETHARGY, HIGH FEVER, ABDOMINAL PAIN, DIFFICULTY BREATHING OR ANY OTHER CONCERNS.    You can give your child 11 mL of Tylenol (160mg/5mL) every 4 hours as needed for fever and/or pain.   You can give your child 11.5 mL of Motrin (100mg/5mL) every 4 hours as needed for fever and/or pain.    You can also alternate Tylenol and Motrin every 3 hours.     Pharyngitis    Pharyngitis is redness, pain, and swelling (inflammation) of your pharynx. Pharyngitis is usually caused by an infection which can be viral or bacterial in origin. Pharyngitis can be contagious and may spread from person to person through intimate contact, coughing, sneezing, or sharing personal items and utensils. Symptoms of pharyngitis may include sore throat, fever, headache, or swollen lymph nodes. If you are prescribed antibiotics, make sure you finish them even if you start to feel better. Gargle with 8 oz of salt water (½ tsp of salt per 1 qt of water) as often as every 1–2 hours to soothe your throat. Throat lozenges (if you are not at risk for choking) or sprays may be used to soothe your throat.    SEEK IMMEDIATE MEDICAL CARE IF YOU HAVE THE FOLLOWING SYMPTOMS: neck stiffness, drooling, inability to swallow liquids, vomiting and inability to keep medicine/liquid down, or trouble breathing.

## 2025-03-27 NOTE — ANESTHESIA PREPROCEDURE EVALUATION
Anesthesia Evaluation     Patient summary reviewed and Nursing notes reviewed                Airway   Mallampati: II  TM distance: >3 FB  Neck ROM: full  No difficulty expected  Dental - normal exam     Pulmonary - negative pulmonary ROS and normal exam   Cardiovascular - normal exam  Exercise tolerance: good (4-7 METS)    (+) hypertension      Neuro/Psych  (+) headaches, psychiatric history Anxiety and Depression  GI/Hepatic/Renal/Endo    (+) morbid obesity    Musculoskeletal (-) negative ROS    Abdominal    Substance History - negative use     OB/GYN    (+) Pregnant, pregnancy induced hypertension        Other                    Anesthesia Plan    ASA 3     epidural     (38w0d  )    Anesthetic plan, risks, benefits, and alternatives have been provided, discussed and informed consent has been obtained with: patient.    CODE STATUS:    Level Of Support Discussed With: Patient  Code Status (Patient has no pulse and is not breathing): CPR (Attempt to Resuscitate)  Medical Interventions (Patient has pulse or is breathing): Full Support       There are not any signs of the diabetes affecting the eyes today.  It is important that you get your eyes dilated once yearly and keep good control of your diabetes.     Ocusoft lid scrubs at night.     OTC Pataday or Lastacaft to be used once daily for itchy eyes.  Use as needed.      Artificial tears- 1 drop both eyes once before bedtime and once in the morning then 2 x day as needed.       Eyeglass prescription given.  No change in eyeglass prescription.     Return in 1 year for a complete eye exam or sooner if needed.     Mj Frederick, OD    The affects of the dilating drops last for 4- 6 hours.  You will be more sensitive to light and vision will be blurry up close.  Do not drive if you do not feel comfortable.  Mydriatic sunglasses were given if needed.    Patient Education   Diabetes weakens the blood vessels all over the body, including the eyes. Damage to the blood vessels in the eyes can cause swelling or bleeding into part of the eye (called the retina). This is called diabetic retinopathy (MART[facility]N-tin--[facility]-the). If not treated, this disease can cause vision loss or blindness.   Symptoms may include blurred or distorted vision, but many people have no symptoms. It's important to see your eye doctor regularly to check for problems.   Early treatment and good control can help protect your vision. Here are the things you can do to help prevent vision loss:      1. Keep your blood sugar levels under tight control.      2. Bring high blood pressure under control.      3. No smoking.      4. Have yearly dilated eye exams.       Optometry Providers       Clinic Locations                                 Telephone Number   Dr. Sruthi Renee   [facility]  [place] [phone]     [facility] Hours:                [facility]  [facility] Hours:       [facility] Hours:   [address]   [address] [address]     [address] [place] [address]   [place] [address]    [place] Renee [address]  Phone: [phone]                    Phone: [phone]     Phone: [phone]                      Monday 8:00-6:00                          Monday 8:00-6:00                          Monday 8:00-6:00              Tuesday 8:00-6:00                          Tuesday 8:00-6:00                          Tuesday 8:00-6:00              Wednesday 8:00-6:00                  Wednesday 8:00-6:00                   Wednesday 8:00-6:00      Thursday 8:00-6:00                        Thursday 8:00-6:00                         Thursday 8:00-6:00            Friday 8:00-5:00                              Friday 8:00-5:00                              Friday 8:00-5:00    [facility] Hours:   [address] [place] [address]  [phone]    Monday 9:00-6:00  Tuesday 9:00-6:00  Wednesday 9:00-6:00  Thursday 9:00-6:00  Friday 9:00-5:00  As always, Thank you for trusting us with your health care needs!    There is a combination of three treatments which can greatly improve symptoms of dry eyes.     Artificial tears  Heat (eyes closed)  Eyelid and eyelash cleansing (eyes closed)     Use one drop of artificial tears both eyes 4 x daily.  Once in the morning, lunch, dinner and bedtime. Continue to use the drops regardless if your eyes are comfortable or not.  Artificial tears work best as a preventative and not as well after your eyes are starting to bother you.  It may take 4- 6 weeks of using the drops before you notice improvement.  If after that time you are still having problems schedule an appointment for an evaluation and discussion of different treatments such as Restasis or Xiidra.  Dry eyes are a chronic condition and you may have more symptoms at certain times of the year.    Excess tearing can be due to the right tears not working  properly or a blockage in the tear drainage system.  You can try using artificial tears 1 drop both eyes 4 x day.  If the excess tearing is bothersome after 4-6 weeks of treatment then we can send you for further testing.  This would entail a referral to our oculoplastic specialist Dr. Eldon Prather at the [facility]-[phone].    Recommended brands are:    Systane Complete  Systane Ultra  Systane Balance  Refresh Advanced Optive  Refresh Relieva  Blink    Recommended brands for contact lens wearers are:    Systane contacts  Refresh contacts  Blink contacts    If you are using drops more than 4 x day or have sensitivities to preservatives I recommend non preserved artificial tears.  These come in 1 use vials.  They can be used every 1-2 hours.  Do not reuse the vials.    Recommended brands are:    Refresh Optive Nito-3  Systane- preservative free  Refresh-  preservative free  Blink- preservative free    Gels or ointment can be used at night.    Recommended brands are:    Systane Gel  Refresh Gel  Blink Gel  Genteal Gel    Systane night time (ointment)  Refresh Celluvisc  Refresh PM (ointment)    Optase dry eye spray.  Spray to eyelids 3-4 x daily.  This can be purchased on Amazon.      Visine, Clear Eyes or Murine (drops that get the red out) can irritate the eyes and cause a rebound effect where the eyes become more red and you end up using more drops.  Avoid drops containing tetrahydrozoline, naphazoline, phenylephrine, oxymetazoline.      OTC Lumify is a newer product that gives immediate redness relief without the rebound effect.  Use as needed to take the redness out.    Artificial tears may be used with other drops (such as allergy, glaucoma, antibiotics) around the same time.  Be sure to wait 5 minutes in between drops.    Heat to the eyelids can also improve your symptoms of dry eyes.  Jim heat masks can be purchased at Amazon to be used nightly for 10-15 minutes.  Other options are gel masks  that can be put in the microwave and purchased at most pharmacies.      Tea Tree Oil eyelid cleansers recommended are Ocusoft Oust foam cleanser to cleanse eyelids/lashes at night and in the am. Other options are Blephadex or Cliradex eyelid wipes.  KEEP EYES CLOSED when using these products.  These can be purchased on amazon.com   A good product for make up remover with tea tree oil is WeLoveEyes.  This can be found at www.[employer] or [employer].    Other good eyelid cleansers have hypochlorous which removes excess bacteria and is safe around the eyes. Products are Avenova, Ocusoft Hypochlor or Heyedrate. Spray solution onto cotton pad, close eyes and gently apply to eyelids and eyelashes using side to side motion.  You can also KEEP EYES CLOSED spray and rub into eyelashes.  You do not need to rinse it off. Use morning and evening. These products can be found on Amazon.  You can check with your local pharmacy and see if they can order if for you if they don't have it.    Other brands of eyelid cleansing wipes are:    Ocusoft wipes  Systane wipes    A great eye make up line is [email or web address]

## 2025-04-03 ENCOUNTER — NURSE TRIAGE (OUTPATIENT)
Dept: CALL CENTER | Facility: HOSPITAL | Age: 25
End: 2025-04-03
Payer: COMMERCIAL

## 2025-04-03 ENCOUNTER — TELEPHONE (OUTPATIENT)
Dept: FAMILY MEDICINE CLINIC | Facility: CLINIC | Age: 25
End: 2025-04-03

## 2025-04-03 NOTE — TELEPHONE ENCOUNTER
Rx Refill Note  Requested Prescriptions     Pending Prescriptions Disp Refills    labetalol (NORMODYNE) 100 MG tablet 60 tablet 1     Sig: Take 1 tablet by mouth 2 (Two) Times a Day.      Last office visit with prescribing clinician: 6/20/2024   Last telemedicine visit with prescribing clinician: Visit date not found   Next office visit with prescribing clinician: 4/9/2025                         Would you like a call back once the refill request has been completed: [] Yes [] No    If the office needs to give you a call back, can they leave a voicemail: [] Yes [] No    Javan Guardado Rep  04/03/25, 14:49 EDT

## 2025-04-03 NOTE — TELEPHONE ENCOUNTER
Caller: Carmita Reyna    Relationship: Self    Best call back number: [phone]    Requested Prescriptions:   Requested Prescriptions     Pending Prescriptions Disp Refills    labetalol (NORMODYNE) 100 MG tablet 60 tablet 1     Sig: Take 1 tablet by mouth 2 (Two) Times a Day.        Pharmacy where request should be sent: [facility] - [facility] - [address] AT [address] TO [facility] - [phone]  - [phone] FX     Last office visit with prescribing clinician: 6/20/2024   Last telemedicine visit with prescribing clinician: Visit date not found   Next office visit with prescribing clinician: 4/9/2025     Additional details provided by patient:     Does the patient have less than a 3 day supply:  [] Yes  [x] No    Would you like a call back once the refill request has been completed: [] Yes [x] No    If the office needs to give you a call back, can they leave a voicemail: [] Yes [x] No    Javan Palomares Rep   04/03/25 13:20 EDT

## 2025-04-03 NOTE — TELEPHONE ENCOUNTER
HUB RELAY:  Left  w/ patient advised Britt Hyatt will be out of office 4/03/2025 and needs to meet. Adv we canceled appt but they can make a new appt via [employer] or call back and make a new appt.

## 2025-04-03 NOTE — TELEPHONE ENCOUNTER
Transferred to [facility] by patient connection Hub d/t high blood pressure. Patient has had postpartum hypertension. Taking Labetalol bid. 120s-130s/80s. Has been seeing OB but was instructed to follow-up with PCP. Has not taken her blood pressure today, but feels normal. Denies any symptoms. Had an appointment scheduled with SHANEKA Shah that was cancelled d/t provider being out of office. Calling to reschedule appointment. Attempted to connect patient with SHANEKA Shah's office to schedule appointment, no answer. Connected patient with Jessica at [facility] patient connection Hub to schedule appointment.    Reason for Disposition   [1] Systolic BP  >= 130 OR Diastolic >= 80 AND [2] taking BP medications    Additional Information   Negative: Difficult to awaken or acting confused (e.g., disoriented, slurred speech)   Negative: SEVERE difficulty breathing (e.g., struggling for each breath, speaks in single words)   Negative: [1] Weakness of the face, arm or leg on one side of the body AND [2] new-onset   Negative: [1] Numbness (i.e., loss of sensation) of the face, arm or leg on one side of the body AND [2] new-onset   Negative: [1] Chest pain lasts > 5 minutes AND [2] history of heart disease (i.e., heart attack, bypass surgery, angina, angioplasty, CHF)   Negative: [1] Chest pain AND [2] took nitrogylcerin AND [3] pain was not relieved   Negative: Sounds like a life-threatening emergency to the triager   Negative: Symptom is main concern (e.g., headache, chest pain)   Negative: Low blood pressure is main concern   Negative: [1] Systolic BP  >= 160 OR Diastolic >= 100 AND [2] cardiac (e.g., breathing difficulty, chest pain) or neurologic symptoms (e.g., new-onset blurred or double vision, unsteady gait)   Negative: [1] Pregnant 20 or more weeks (or postpartum < 6 weeks) AND [2] new hand or face swelling   Negative: [1] Pregnant 20 or more weeks (or postpartum < 6 weeks) AND [2] Systolic BP >= 160 OR Diastolic >=  "110   Negative: [1] Systolic BP  >= 200 OR Diastolic >= 120 AND [2] having NO cardiac or neurologic symptoms   Negative: [1] Pregnant 20 or more weeks (or postpartum < 6 weeks) AND [2] Systolic BP  >= 140 OR Diastolic >= 90   Negative: [1] Systolic BP  >= 180 OR Diastolic >= 110 AND [2] missed most recent dose of blood pressure medication   Negative: Systolic BP  >= 180 OR Diastolic >= 110   Negative: Ran out of BP medications   Negative: Systolic BP  >= 160 OR Diastolic >= 100   Negative: [1] Taking BP medications AND [2] feels is having side effects (e.g., impotence, cough, dizzy upon standing)   Negative: [1] Systolic BP  >= 130 OR Diastolic >= 80 AND [2] pregnant    Answer Assessment - Initial Assessment Questions  1. BLOOD PRESSURE: \"What is the blood pressure?\" \"Did you take at least two measurements 5 minutes apart?\"      Has not taken today   2. ONSET: \"When did you take your blood pressure?\"      N/A  3. HOW: \"How did you take your blood pressure?\" (e.g., automatic home BP monitor, visiting nurse)      N/A  4. HISTORY: \"Do you have a history of high blood pressure?\"      Postpartum hypertension  5. MEDICINES: \"Are you taking any medicines for blood pressure?\" \"Have you missed any doses recently?\"      Labetalol, no missed doses  6. OTHER SYMPTOMS: \"Do you have any symptoms?\" (e.g., blurred vision, chest pain, difficulty breathing, headache, weakness)      None   7. PREGNANCY: \"Is there any chance you are pregnant?\" \"When was your last menstrual period?\"      Postpartum    Protocols used: Blood Pressure - High-ADULT-AH    "

## 2025-04-07 RX ORDER — LABETALOL 100 MG/1
100 TABLET, FILM COATED ORAL 2 TIMES DAILY
Qty: 60 TABLET | Refills: 1 | Status: SHIPPED | OUTPATIENT
Start: 2025-04-07

## 2025-04-09 ENCOUNTER — OFFICE VISIT (OUTPATIENT)
Dept: FAMILY MEDICINE CLINIC | Facility: CLINIC | Age: 25
End: 2025-04-09
Payer: COMMERCIAL

## 2025-04-09 VITALS
BODY MASS INDEX: 38.84 KG/M2 | TEMPERATURE: 97.7 F | WEIGHT: 219.2 LBS | OXYGEN SATURATION: 97 % | RESPIRATION RATE: 14 BRPM | DIASTOLIC BLOOD PRESSURE: 86 MMHG | SYSTOLIC BLOOD PRESSURE: 140 MMHG | HEART RATE: 95 BPM | HEIGHT: 63 IN

## 2025-04-09 DIAGNOSIS — Z00.00 ROUTINE GENERAL MEDICAL EXAMINATION AT A HEALTH CARE FACILITY: ICD-10-CM

## 2025-04-09 DIAGNOSIS — Z13.21 ENCOUNTER FOR SCREENING FOR NUTRITIONAL DISORDER: ICD-10-CM

## 2025-04-09 NOTE — PROGRESS NOTES
Chief Complaint  gestational hypertention    Subjective        Carmita Reyna presents to [facility]  History of Present Illness    History of Present Illness  The patient presents for evaluation of gestational hypertension.      She was induced due to elevated blood pressure, which has persisted postpartum. She is currently on labetalol 100 mg twice daily but reports inconsistent adherence to the regimen. Despite this, her blood pressure readings have been around 140s/90s, with one instance of a diastolic reading of 104. She is not sure if she took her medication today. She is not experiencing any chest pain, headaches, or dizziness, although she has a history of dizziness, which she attributes to either anxiety or blood pressure fluctuations. She is supposed to check her blood pressure daily but checks it every few days as she gets anxious when it is high. She reports no edema but notes occasional facial puffiness. She reports no side effects from labetalol and has not observed any impact on her infant. Her heart rate has consistently been between 85 and 100. She is also taking prenatal vitamins and Prozac. She maintains good hydration and sleeps approximately 4 to 5 hours per night. She is breastfeeding and has an upcoming appointment for IUD placement in 2 weeks. She resumed menstruation at 6 weeks postpartum. She had a normal delivery with epidural and pushed the baby out in 20 minutes. She was admitted to the hospital twice during her pregnancy due to gestational hypertension but did not have preeclampsia.    She is working with lactation as the baby is not latching and is pumping and bottle feeding. She has a low supply and is working hard to pump all the time and only make half of what the baby needs. She is going to see lactation in 2 days and wonders if they will put her on something on Friday.    She has a history of vitamin B and D deficiencies but does not recall when these  "were last evaluated.    MEDICATIONS  Current       Objective   Vital Signs:  /86   Pulse 95   Temp 97.7 °F (36.5 °C) (Infrared)   Resp 14   Ht 160 cm (63\")   Wt 99.4 kg (219 lb 3.2 oz)   SpO2 97%   BMI 38.83 kg/m²   Estimated body mass index is 38.83 kg/m² as calculated from the following:    Height as of this encounter: 160 cm (63\").    Weight as of this encounter: 99.4 kg (219 lb 3.2 oz).               Physical Exam  Vitals and nursing note reviewed.   Constitutional:       General: She is not in acute distress.     Appearance: She is well-developed. She is not ill-appearing or diaphoretic.   HENT:      Head: Normocephalic and atraumatic.   Eyes:      General:         Right eye: No discharge.         Left eye: No discharge.      Conjunctiva/sclera: Conjunctivae normal.   Cardiovascular:      Rate and Rhythm: Normal rate and regular rhythm.   Pulmonary:      Effort: Pulmonary effort is normal.      Breath sounds: Normal breath sounds.   Abdominal:      General: Bowel sounds are normal. There is no distension.      Palpations: Abdomen is soft.      Tenderness: There is no abdominal tenderness.   Musculoskeletal:         General: No deformity.      Comments: Gait smooth and steady   Skin:     General: Skin is warm and dry.   Neurological:      General: No focal deficit present.      Mental Status: She is alert and oriented to person, place, and time.   Psychiatric:         Mood and Affect: Mood normal.         Behavior: Behavior normal.         Thought Content: Thought content normal.          Physical Exam      Vital Signs  Blood pressure is around 140s/90s. Heart rate is 95.     Result Review :            Results  Laboratory Studies  Kidney function looked good.                Assessment and Plan     Diagnoses and all orders for this visit:    1. Postpartum hypertension (Primary)  -     Microalbumin / Creatinine Urine Ratio - Urine, Clean Catch; Future    2. Routine general medical examination at a " health care facility  -     Vitamin B12; Future  -     Folate; Future  -     CBC (No Diff); Future  -     Comprehensive Metabolic Panel; Future  -     Hemoglobin A1c; Future  -     Lipid Panel With LDL / HDL Ratio; Future  -     TSH Rfx On Abnormal To Free T4; Future    3. Encounter for screening for nutritional disorder  -     Vitamin D,25-Hydroxy; Future  -     Iron Profile; Future        Assessment & Plan  1. Postpartum hypertension-poorly controlled  Her blood pressure remains elevated postpartum, with readings around 140s/90s. She is currently on labetalol 100 mg twice daily. The dosage will be increased to 200 mg twice daily, but she is advised to start with 1.5 tablets in the morning and 1 tablet in the evening for a week. If there is no improvement after 3-4 days, she can increase to 2 tablets in the morning and 1 tablet in the evening. She should monitor her blood pressure and heart rate, especially before the next dose and a couple of hours after taking the medication, and report any significant changes. Adequate hydration is emphasized to prevent dizziness.    2. Low milk supply.  She is working with a lactation consultant to address low milk supply and difficulty with the baby latching. No supplements have been prescribed yet, but she will follow up with the lactation consultant in 2 days.    3. Health maintenance.  She is advised to continue taking her prenatal vitamins, which contain adequate amounts of vitamins B and D. A physical examination will be scheduled in approximately one month, during which laboratory tests will be conducted to assess her vitamin levels.            Follow Up     No follow-ups on file.  Patient was given instructions and counseling regarding her condition or for health maintenance advice. Please see specific information pulled into the AVS if appropriate.    Patient or patient representative verbalized consent for the use of Ambient Listening during the visit with  Britt PRADHAN  SHANEKA Hyatt for chart documentation. 4/25/2025  14:06 EDT

## 2025-05-05 ENCOUNTER — TELEPHONE (OUTPATIENT)
Dept: FAMILY MEDICINE CLINIC | Facility: CLINIC | Age: 25
End: 2025-05-05

## 2025-05-05 ENCOUNTER — OFFICE VISIT (OUTPATIENT)
Dept: FAMILY MEDICINE CLINIC | Facility: CLINIC | Age: 25
End: 2025-05-05
Payer: COMMERCIAL

## 2025-05-05 VITALS
WEIGHT: 219.4 LBS | TEMPERATURE: 97.1 F | BODY MASS INDEX: 38.88 KG/M2 | OXYGEN SATURATION: 100 % | DIASTOLIC BLOOD PRESSURE: 80 MMHG | HEART RATE: 84 BPM | RESPIRATION RATE: 12 BRPM | SYSTOLIC BLOOD PRESSURE: 128 MMHG | HEIGHT: 63 IN

## 2025-05-05 DIAGNOSIS — Z00.00 ROUTINE GENERAL MEDICAL EXAMINATION AT A HEALTH CARE FACILITY: Primary | ICD-10-CM

## 2025-05-05 PROCEDURE — 99395 PREV VISIT EST AGE 18-39: CPT | Performed by: NURSE PRACTITIONER

## 2025-05-05 NOTE — TELEPHONE ENCOUNTER
Called Pt in regards to appt schedule for today since she is scheduled as an Ov but is needing and wanting a physical done. Advised Pt we would need to reschedule appt due to an Ov being 15min and a physical is allotted for 30min. Pt stated Olena advised her that if she couldn't find a physical slot when scheduling in the time frame she was needing she could schedule for a regular visit and put in appt notes that she is wanting a physical. Spoke with MA and confirmed Pt can still come in for appt.

## 2025-05-05 NOTE — PROGRESS NOTES
"Chief Complaint  Annual Exam    Subjective        Carmita Reyna presents to [facility]  History of Present Illness    History of Present Illness  Pt here for physical.  Overall doing well. New baby which has been a happy adjustment.  Had follow up here appr 1 month ago due to PP HTN which continued beyond birth.     Latching issues in baby-pumping.  Taking PMVI.     BP controlled with current BB without side effects.     Doing well on current prozac.      A review of systems was performed, and the pertinent positives are noted in the HPI.        Objective   Vital Signs:  /80   Pulse 84   Temp 97.1 °F (36.2 °C) (Infrared)   Resp 12   Ht 160 cm (63\")   Wt 99.5 kg (219 lb 6.4 oz)   SpO2 100%   BMI 38.86 kg/m²   Estimated body mass index is 38.86 kg/m² as calculated from the following:    Height as of this encounter: 160 cm (63\").    Weight as of this encounter: 99.5 kg (219 lb 6.4 oz).               Physical Exam  Vitals and nursing note reviewed.   Constitutional:       General: She is not in acute distress.     Appearance: She is well-developed. She is not ill-appearing.   HENT:      Head: Normocephalic and atraumatic.      Right Ear: Tympanic membrane, ear canal and external ear normal.      Left Ear: Tympanic membrane, ear canal and external ear normal.      Mouth/Throat:      Mouth: Mucous membranes are moist.      Pharynx: Uvula midline. No posterior oropharyngeal erythema.   Eyes:      General: No scleral icterus.        Right eye: No discharge.         Left eye: No discharge.      Conjunctiva/sclera: Conjunctivae normal.      Pupils: Pupils are equal, round, and reactive to light.   Neck:      Thyroid: No thyromegaly.   Cardiovascular:      Rate and Rhythm: Normal rate and regular rhythm.      Heart sounds: Normal heart sounds. No murmur heard.  Pulmonary:      Effort: Pulmonary effort is normal.      Breath sounds: Normal breath sounds.   Abdominal:      General: Bowel " sounds are normal. There is no distension.      Palpations: Abdomen is soft.      Tenderness: There is no abdominal tenderness.   Musculoskeletal:         General: No deformity.      Cervical back: Neck supple.      Comments: Gait smooth and steady   Lymphadenopathy:      Cervical: No cervical adenopathy.   Skin:     General: Skin is warm and dry.   Neurological:      General: No focal deficit present.      Mental Status: She is alert and oriented to person, place, and time.   Psychiatric:         Mood and Affect: Mood normal.         Behavior: Behavior normal.         Thought Content: Thought content normal.          Physical Exam       Result Review :            Results                  Assessment and Plan     Diagnoses and all orders for this visit:    1. Routine general medical examination at a health care facility (Primary)  -     CBC (No Diff)  -     Comprehensive Metabolic Panel  -     Iron Profile  -     TSH  -     T4, Free  -     Vitamin B12  -     Vitamin D,25-Hydroxy        Assessment & Plan  Appropriate health maintenance and prevention topics specific for this patient were discussed today.  Additionally, health goals, and health concerns addressed as appropriate.  Pt was encouraged to stay up to date on recommended screenings and vaccines based on USPSTF guidelines.     Routine labs today for physical. Iron previously low-will check today.    HTN controlled with current medication which we will continue.  Monitor BP at home.             Follow Up     No follow-ups on file.  Patient was given instructions and counseling regarding her condition or for health maintenance advice. Please see specific information pulled into the AVS if appropriate.

## 2025-05-06 LAB
25(OH)D3+25(OH)D2 SERPL-MCNC: 40.8 NG/ML (ref 30–100)
ALBUMIN SERPL-MCNC: 4.6 G/DL (ref 3.5–5.2)
ALBUMIN/GLOB SERPL: 2.1 G/DL
ALP SERPL-CCNC: 71 U/L (ref 39–117)
ALT SERPL-CCNC: 23 U/L (ref 1–33)
AST SERPL-CCNC: 17 U/L (ref 1–32)
BILIRUB SERPL-MCNC: <0.2 MG/DL (ref 0–1.2)
BUN SERPL-MCNC: 15 MG/DL (ref 6–20)
BUN/CREAT SERPL: 18.5 (ref 7–25)
CALCIUM SERPL-MCNC: 10.5 MG/DL (ref 8.6–10.5)
CHLORIDE SERPL-SCNC: 100 MMOL/L (ref 98–107)
CO2 SERPL-SCNC: 23.5 MMOL/L (ref 22–29)
CREAT SERPL-MCNC: 0.81 MG/DL (ref 0.57–1)
EGFRCR SERPLBLD CKD-EPI 2021: 102.8 ML/MIN/1.73
ERYTHROCYTE [DISTWIDTH] IN BLOOD BY AUTOMATED COUNT: 13.1 % (ref 12.3–15.4)
GLOBULIN SER CALC-MCNC: 2.2 GM/DL
GLUCOSE SERPL-MCNC: 83 MG/DL (ref 65–99)
HCT VFR BLD AUTO: 39.5 % (ref 34–46.6)
HGB BLD-MCNC: 12.9 G/DL (ref 12–15.9)
IRON SATN MFR SERPL: 17 % (ref 20–50)
IRON SERPL-MCNC: 73 MCG/DL (ref 37–145)
MCH RBC QN AUTO: 30.4 PG (ref 26.6–33)
MCHC RBC AUTO-ENTMCNC: 32.7 G/DL (ref 31.5–35.7)
MCV RBC AUTO: 93.2 FL (ref 79–97)
PLATELET # BLD AUTO: 349 10*3/MM3 (ref 140–450)
POTASSIUM SERPL-SCNC: 3.9 MMOL/L (ref 3.5–5.2)
PROT SERPL-MCNC: 6.8 G/DL (ref 6–8.5)
RBC # BLD AUTO: 4.24 10*6/MM3 (ref 3.77–5.28)
SODIUM SERPL-SCNC: 138 MMOL/L (ref 136–145)
T4 FREE SERPL-MCNC: 1.16 NG/DL (ref 0.92–1.68)
TIBC SERPL-MCNC: 420 MCG/DL
TSH SERPL DL<=0.005 MIU/L-ACNC: 1.91 UIU/ML (ref 0.27–4.2)
UIBC SERPL-MCNC: 347 MCG/DL (ref 112–346)
VIT B12 SERPL-MCNC: 767 PG/ML (ref 211–946)
WBC # BLD AUTO: 9.13 10*3/MM3 (ref 3.4–10.8)